# Patient Record
Sex: FEMALE | Race: WHITE | Employment: OTHER | URBAN - METROPOLITAN AREA
[De-identification: names, ages, dates, MRNs, and addresses within clinical notes are randomized per-mention and may not be internally consistent; named-entity substitution may affect disease eponyms.]

---

## 2020-01-24 ENCOUNTER — OP HISTORICAL/CONVERTED ENCOUNTER (OUTPATIENT)
Dept: OTHER | Age: 55
End: 2020-01-24

## 2022-02-13 ENCOUNTER — APPOINTMENT (OUTPATIENT)
Dept: GENERAL RADIOLOGY | Age: 57
DRG: 698 | End: 2022-02-13
Attending: STUDENT IN AN ORGANIZED HEALTH CARE EDUCATION/TRAINING PROGRAM
Payer: MEDICARE

## 2022-02-13 ENCOUNTER — HOSPITAL ENCOUNTER (INPATIENT)
Age: 57
LOS: 3 days | Discharge: LEFT BEFORE DISCHARGE | DRG: 698 | End: 2022-02-16
Attending: STUDENT IN AN ORGANIZED HEALTH CARE EDUCATION/TRAINING PROGRAM | Admitting: INTERNAL MEDICINE
Payer: MEDICARE

## 2022-02-13 ENCOUNTER — APPOINTMENT (OUTPATIENT)
Dept: CT IMAGING | Age: 57
DRG: 698 | End: 2022-02-13
Attending: STUDENT IN AN ORGANIZED HEALTH CARE EDUCATION/TRAINING PROGRAM
Payer: MEDICARE

## 2022-02-13 DIAGNOSIS — N17.9 AKI (ACUTE KIDNEY INJURY) (HCC): ICD-10-CM

## 2022-02-13 DIAGNOSIS — R19.7 DIARRHEA, UNSPECIFIED TYPE: ICD-10-CM

## 2022-02-13 DIAGNOSIS — N39.0 URINARY TRACT INFECTION WITHOUT HEMATURIA, SITE UNSPECIFIED: ICD-10-CM

## 2022-02-13 DIAGNOSIS — A41.9 SEPSIS, DUE TO UNSPECIFIED ORGANISM, UNSPECIFIED WHETHER ACUTE ORGAN DYSFUNCTION PRESENT (HCC): Primary | ICD-10-CM

## 2022-02-13 LAB
ABO + RH BLD: NORMAL
ALBUMIN SERPL-MCNC: 3.6 G/DL (ref 3.5–5)
ALBUMIN/GLOB SERPL: 1.1 {RATIO} (ref 1.1–2.2)
ALP SERPL-CCNC: 53 U/L (ref 45–117)
ALT SERPL-CCNC: 20 U/L (ref 12–78)
ANION GAP SERPL CALC-SCNC: 7 MMOL/L (ref 5–15)
APPEARANCE UR: ABNORMAL
AST SERPL W P-5'-P-CCNC: 17 U/L (ref 15–37)
BACTERIA URNS QL MICRO: NEGATIVE /HPF
BASOPHILS # BLD: 0 K/UL (ref 0–0.1)
BASOPHILS NFR BLD: 0 % (ref 0–1)
BILIRUB SERPL-MCNC: 0.4 MG/DL (ref 0.2–1)
BILIRUB UR QL: NEGATIVE
BLOOD GROUP ANTIBODIES SERPL: NEGATIVE
BUN SERPL-MCNC: 22 MG/DL (ref 6–20)
BUN/CREAT SERPL: 21 (ref 12–20)
CA-I BLD-MCNC: 9.4 MG/DL (ref 8.5–10.1)
CHLORIDE SERPL-SCNC: 105 MMOL/L (ref 97–108)
CO2 SERPL-SCNC: 25 MMOL/L (ref 21–32)
COLOR UR: ABNORMAL
COVID-19 RAPID TEST, COVR: NOT DETECTED
CREAT SERPL-MCNC: 1.07 MG/DL (ref 0.55–1.02)
DIFFERENTIAL METHOD BLD: ABNORMAL
EOSINOPHIL # BLD: 0 K/UL (ref 0–0.4)
EOSINOPHIL NFR BLD: 0 % (ref 0–7)
ERYTHROCYTE [DISTWIDTH] IN BLOOD BY AUTOMATED COUNT: 12.6 % (ref 11.5–14.5)
GLOBULIN SER CALC-MCNC: 3.4 G/DL (ref 2–4)
GLUCOSE SERPL-MCNC: 193 MG/DL (ref 65–100)
GLUCOSE UR STRIP.AUTO-MCNC: NEGATIVE MG/DL
HCT VFR BLD AUTO: 43.3 % (ref 35–47)
HGB BLD-MCNC: 14.1 G/DL (ref 11.5–16)
HGB UR QL STRIP: NEGATIVE
IMM GRANULOCYTES # BLD AUTO: 0.1 K/UL (ref 0–0.04)
IMM GRANULOCYTES NFR BLD AUTO: 0 % (ref 0–0.5)
KETONES UR QL STRIP.AUTO: NEGATIVE MG/DL
LACTATE SERPL-SCNC: 2.4 MMOL/L (ref 0.4–2)
LACTATE SERPL-SCNC: 3.6 MMOL/L (ref 0.4–2)
LEUKOCYTE ESTERASE UR QL STRIP.AUTO: ABNORMAL
LYMPHOCYTES # BLD: 0.2 K/UL (ref 0.8–3.5)
LYMPHOCYTES NFR BLD: 1 % (ref 12–49)
MCH RBC QN AUTO: 29 PG (ref 26–34)
MCHC RBC AUTO-ENTMCNC: 32.6 G/DL (ref 30–36.5)
MCV RBC AUTO: 89.1 FL (ref 80–99)
MONOCYTES # BLD: 0.9 K/UL (ref 0–1)
MONOCYTES NFR BLD: 7 % (ref 5–13)
MUCOUS THREADS URNS QL MICRO: ABNORMAL /LPF
NEUTS SEG # BLD: 12.3 K/UL (ref 1.8–8)
NEUTS SEG NFR BLD: 92 % (ref 32–75)
NITRITE UR QL STRIP.AUTO: POSITIVE
NRBC # BLD: 0 K/UL (ref 0–0.01)
NRBC BLD-RTO: 0 PER 100 WBC
PH UR STRIP: 8 [PH] (ref 5–8)
PLATELET # BLD AUTO: 347 K/UL (ref 150–400)
PMV BLD AUTO: 9.7 FL (ref 8.9–12.9)
POTASSIUM SERPL-SCNC: 4.3 MMOL/L (ref 3.5–5.1)
PROT SERPL-MCNC: 7 G/DL (ref 6.4–8.2)
PROT UR STRIP-MCNC: NEGATIVE MG/DL
RBC # BLD AUTO: 4.86 M/UL (ref 3.8–5.2)
RBC #/AREA URNS HPF: ABNORMAL /HPF (ref 0–5)
SODIUM SERPL-SCNC: 137 MMOL/L (ref 136–145)
SP GR UR REFRACTOMETRY: 1.01 (ref 1–1.03)
SPECIMEN EXP DATE BLD: NORMAL
SPECIMEN SOURCE: NORMAL
UROBILINOGEN UR QL STRIP.AUTO: 0.1 EU/DL (ref 0.1–1)
WBC # BLD AUTO: 13.5 K/UL (ref 3.6–11)
WBC URNS QL MICRO: ABNORMAL /HPF (ref 0–4)

## 2022-02-13 PROCEDURE — 80053 COMPREHEN METABOLIC PANEL: CPT

## 2022-02-13 PROCEDURE — 65270000029 HC RM PRIVATE

## 2022-02-13 PROCEDURE — 96375 TX/PRO/DX INJ NEW DRUG ADDON: CPT

## 2022-02-13 PROCEDURE — 71045 X-RAY EXAM CHEST 1 VIEW: CPT

## 2022-02-13 PROCEDURE — 99285 EMERGENCY DEPT VISIT HI MDM: CPT

## 2022-02-13 PROCEDURE — 74178 CT ABD&PLV WO CNTR FLWD CNTR: CPT

## 2022-02-13 PROCEDURE — 87635 SARS-COV-2 COVID-19 AMP PRB: CPT

## 2022-02-13 PROCEDURE — 74011000258 HC RX REV CODE- 258: Performed by: INTERNAL MEDICINE

## 2022-02-13 PROCEDURE — 96361 HYDRATE IV INFUSION ADD-ON: CPT

## 2022-02-13 PROCEDURE — 74011000636 HC RX REV CODE- 636: Performed by: STUDENT IN AN ORGANIZED HEALTH CARE EDUCATION/TRAINING PROGRAM

## 2022-02-13 PROCEDURE — 96365 THER/PROPH/DIAG IV INF INIT: CPT

## 2022-02-13 PROCEDURE — 87040 BLOOD CULTURE FOR BACTERIA: CPT

## 2022-02-13 PROCEDURE — 87077 CULTURE AEROBIC IDENTIFY: CPT

## 2022-02-13 PROCEDURE — 74011250637 HC RX REV CODE- 250/637: Performed by: STUDENT IN AN ORGANIZED HEALTH CARE EDUCATION/TRAINING PROGRAM

## 2022-02-13 PROCEDURE — 36415 COLL VENOUS BLD VENIPUNCTURE: CPT

## 2022-02-13 PROCEDURE — 96367 TX/PROPH/DG ADDL SEQ IV INF: CPT

## 2022-02-13 PROCEDURE — 81001 URINALYSIS AUTO W/SCOPE: CPT

## 2022-02-13 PROCEDURE — 74011000258 HC RX REV CODE- 258: Performed by: STUDENT IN AN ORGANIZED HEALTH CARE EDUCATION/TRAINING PROGRAM

## 2022-02-13 PROCEDURE — 87186 SC STD MICRODIL/AGAR DIL: CPT

## 2022-02-13 PROCEDURE — 85025 COMPLETE CBC W/AUTO DIFF WBC: CPT

## 2022-02-13 PROCEDURE — 96366 THER/PROPH/DIAG IV INF ADDON: CPT

## 2022-02-13 PROCEDURE — 74011250636 HC RX REV CODE- 250/636: Performed by: STUDENT IN AN ORGANIZED HEALTH CARE EDUCATION/TRAINING PROGRAM

## 2022-02-13 PROCEDURE — 86900 BLOOD TYPING SEROLOGIC ABO: CPT

## 2022-02-13 PROCEDURE — 74011250636 HC RX REV CODE- 250/636: Performed by: INTERNAL MEDICINE

## 2022-02-13 PROCEDURE — 83605 ASSAY OF LACTIC ACID: CPT

## 2022-02-13 PROCEDURE — 87086 URINE CULTURE/COLONY COUNT: CPT

## 2022-02-13 RX ORDER — ONDANSETRON 2 MG/ML
4 INJECTION INTRAMUSCULAR; INTRAVENOUS
Status: COMPLETED | OUTPATIENT
Start: 2022-02-13 | End: 2022-02-13

## 2022-02-13 RX ORDER — METOPROLOL SUCCINATE 25 MG/1
25 TABLET, EXTENDED RELEASE ORAL DAILY
Status: DISCONTINUED | OUTPATIENT
Start: 2022-02-14 | End: 2022-02-16 | Stop reason: HOSPADM

## 2022-02-13 RX ORDER — ACETAMINOPHEN 650 MG/1
650 SUPPOSITORY RECTAL
Status: DISCONTINUED | OUTPATIENT
Start: 2022-02-13 | End: 2022-02-16 | Stop reason: HOSPADM

## 2022-02-13 RX ORDER — ENALAPRIL MALEATE 2.5 MG/1
2.5 TABLET ORAL DAILY
Status: DISCONTINUED | OUTPATIENT
Start: 2022-02-14 | End: 2022-02-16 | Stop reason: HOSPADM

## 2022-02-13 RX ORDER — ONDANSETRON 2 MG/ML
4 INJECTION INTRAMUSCULAR; INTRAVENOUS
Status: DISCONTINUED | OUTPATIENT
Start: 2022-02-13 | End: 2022-02-16 | Stop reason: HOSPADM

## 2022-02-13 RX ORDER — SODIUM CHLORIDE 0.9 % (FLUSH) 0.9 %
5-40 SYRINGE (ML) INJECTION EVERY 8 HOURS
Status: DISCONTINUED | OUTPATIENT
Start: 2022-02-13 | End: 2022-02-14

## 2022-02-13 RX ORDER — ENOXAPARIN SODIUM 100 MG/ML
40 INJECTION SUBCUTANEOUS DAILY
Status: DISCONTINUED | OUTPATIENT
Start: 2022-02-14 | End: 2022-02-13

## 2022-02-13 RX ORDER — CLOPIDOGREL BISULFATE 75 MG/1
75 TABLET ORAL DAILY
COMMUNITY

## 2022-02-13 RX ORDER — ONDANSETRON 4 MG/1
4 TABLET, ORALLY DISINTEGRATING ORAL
Status: DISCONTINUED | OUTPATIENT
Start: 2022-02-13 | End: 2022-02-16 | Stop reason: HOSPADM

## 2022-02-13 RX ORDER — SODIUM CHLORIDE 0.9 % (FLUSH) 0.9 %
5-40 SYRINGE (ML) INJECTION AS NEEDED
Status: DISCONTINUED | OUTPATIENT
Start: 2022-02-13 | End: 2022-02-16 | Stop reason: HOSPADM

## 2022-02-13 RX ORDER — METFORMIN HYDROCHLORIDE 1000 MG/1
1000 TABLET ORAL 2 TIMES DAILY
COMMUNITY

## 2022-02-13 RX ORDER — ENALAPRIL MALEATE 2.5 MG/1
2.5 TABLET ORAL DAILY
COMMUNITY

## 2022-02-13 RX ORDER — LOVASTATIN 40 MG/1
40 TABLET ORAL DAILY
COMMUNITY

## 2022-02-13 RX ORDER — POLYETHYLENE GLYCOL 3350 17 G/17G
17 POWDER, FOR SOLUTION ORAL DAILY PRN
Status: DISCONTINUED | OUTPATIENT
Start: 2022-02-13 | End: 2022-02-16 | Stop reason: HOSPADM

## 2022-02-13 RX ORDER — METFORMIN HYDROCHLORIDE 500 MG/1
1000 TABLET ORAL 2 TIMES DAILY WITH MEALS
Status: DISCONTINUED | OUTPATIENT
Start: 2022-02-14 | End: 2022-02-14

## 2022-02-13 RX ORDER — ACETAMINOPHEN 500 MG
1000 TABLET ORAL
Status: COMPLETED | OUTPATIENT
Start: 2022-02-13 | End: 2022-02-13

## 2022-02-13 RX ORDER — ACETAMINOPHEN 325 MG/1
650 TABLET ORAL
Status: DISCONTINUED | OUTPATIENT
Start: 2022-02-13 | End: 2022-02-16 | Stop reason: HOSPADM

## 2022-02-13 RX ORDER — CLOPIDOGREL BISULFATE 75 MG/1
75 TABLET ORAL EVERY 24 HOURS
Status: DISCONTINUED | OUTPATIENT
Start: 2022-02-13 | End: 2022-02-13

## 2022-02-13 RX ORDER — METOPROLOL SUCCINATE 25 MG/1
25 TABLET, EXTENDED RELEASE ORAL DAILY
COMMUNITY

## 2022-02-13 RX ORDER — ATORVASTATIN CALCIUM 10 MG/1
10 TABLET, FILM COATED ORAL
Status: DISCONTINUED | OUTPATIENT
Start: 2022-02-14 | End: 2022-02-16 | Stop reason: HOSPADM

## 2022-02-13 RX ADMIN — SODIUM CHLORIDE 1000 ML: 9 INJECTION, SOLUTION INTRAVENOUS at 19:49

## 2022-02-13 RX ADMIN — SODIUM CHLORIDE 1000 ML: 9 INJECTION, SOLUTION INTRAVENOUS at 15:28

## 2022-02-13 RX ADMIN — ONDANSETRON 4 MG: 2 INJECTION INTRAMUSCULAR; INTRAVENOUS at 16:09

## 2022-02-13 RX ADMIN — VANCOMYCIN HYDROCHLORIDE 500 MG: 500 INJECTION, POWDER, LYOPHILIZED, FOR SOLUTION INTRAVENOUS at 20:34

## 2022-02-13 RX ADMIN — IOPAMIDOL 100 ML: 755 INJECTION, SOLUTION INTRAVENOUS at 17:34

## 2022-02-13 RX ADMIN — PIPERACILLIN AND TAZOBACTAM 3.38 G: 3; .375 INJECTION, POWDER, LYOPHILIZED, FOR SOLUTION INTRAVENOUS at 16:10

## 2022-02-13 RX ADMIN — VANCOMYCIN HYDROCHLORIDE 1000 MG: 1 INJECTION, POWDER, LYOPHILIZED, FOR SOLUTION INTRAVENOUS at 18:24

## 2022-02-13 RX ADMIN — ACETAMINOPHEN 1000 MG: 500 TABLET ORAL at 18:29

## 2022-02-13 NOTE — ED PROVIDER NOTES
[facility]  EMERGENCY DEPARTMENT ENCOUNTER NOTE    Date: 2/13/2022  Patient Name: Fadi Banks    History of Presenting Illness     Chief Complaint   Patient presents with    Vomiting    Blood in Urine     HPI: Fadi Banks, 61 y.o. female with A past medical history of bladder cancer status post cystectomy currently with urostomy, diabetes, hypertension, CAD status post stents, and CVA x2, currently on Metformin, metoprolol, Plavix, Januvia, and Metformin presents for sepsis. Patient reports that she woke up today with nausea and vomiting. Denies abdominal pain, headache, cough, shortness of breath, rhinorrhea, sore throat. New hematuria and tissue were noted in her urostomy bag but she has adequate output. Medical History   I reviewed the medical, surgical, family, and social history, as well as allergies:    PCP: Corwin Diaz MD    Past Medical History:  History reviewed. No pertinent past medical history. Past Surgical History:  No past surgical history on file. Current Outpatient Medications:  No current outpatient medications   Family History:  History reviewed. No pertinent family history. Social History:  Social History     Tobacco Use    Smoking status: Not on file    Smokeless tobacco: Not on file   Substance Use Topics    Alcohol use: Not on file    Drug use: Not on file     Allergies:  No Known Allergies    Review of Systems     Review of Systems  Negative: All other systems negative. Physical Exam and Vital Signs   Vital Signs - Reviewed the patient's vital signs.     Patient Vitals for the past 12 hrs:   Temp Pulse Resp BP SpO2   02/13/22 1745 (!) 101.1 °F (38.4 °C) -- -- -- --   02/13/22 1529 -- 88 17 109/60 98 %   02/13/22 1448 (!) 100.6 °F (38.1 °C) 91 18 (!) 84/60 99 %     Physical Exam:    GENERAL: awake, alert, cooperative, not in distress  HEENT:  * Pupils equal, EOMI  * Head atraumatic  CV:  * regular rhythm  * warm and perfused extremities bilaterally  PULMONARY: Good air movement, no wheezes or crackles  ABDOMEN: soft, not distended, no guarding, not tenderness to palpation  : No suprapubic tenderness. Gross hematuria, pinkish color, of small amount of urine in the urostomy bag with tissue collections. EXTREMITIES/BACK: warm and perfused, no tenderness, no edema  SKIN: no rashes or signs of trauma  NEURO:  * Speech clear  * Moves U&LE to command    Medical Decision Making   - I am the first and primary provider for this patient and am the primary provider of record. - I reviewed the vital signs, available nursing notes, past medical history, past surgical history, family history and social history. - Initial assessment performed. The patients presenting problems have been discussed, and the staff are in agreement with the care plan formulated and outlined with them. I have encouraged them to ask questions as they arise throughout their visit. - Available medical records, nursing notes, old EKGs, and EMS run sheets (if patient was EMS transported) were reviewed    MDM:   Patient is a 61 y.o. female presenting for fever and vomiting. Vitals reveal fever, HR 90 and low BP and physical exam reveals urostomy hematuria. Based on the history, physical exam, risk factors, and vitals signs, differential includes: Hematuria, UTI, COVID-19, pneumonia, colitis, enteritis, hepatitis. Will start antibiotics. Will give fluids. Will swab for Covid. Sepsis workup initiated. Will initiate workup and symptomatic treatment. See ED Course and Reassessment for results and interpretations.     Results     Labs:  Recent Results (from the past 12 hour(s))   CBC WITH AUTOMATED DIFF    Collection Time: 02/13/22  3:19 PM   Result Value Ref Range    WBC 13.5 (H) 3.6 - 11.0 K/uL    RBC 4.86 3.80 - 5.20 M/uL    HGB 14.1 11.5 - 16.0 g/dL    HCT 43.3 35.0 - 47.0 %    MCV 89.1 80.0 - 99.0 FL    MCH 29.0 26.0 - 34.0 PG    MCHC 32.6 30.0 - 36.5 g/dL RDW 12.6 11.5 - 14.5 %    PLATELET 336 647 - 201 K/uL    MPV 9.7 8.9 - 12.9 FL    NRBC 0.0 0.0  WBC    ABSOLUTE NRBC 0.00 0.00 - 0.01 K/uL    NEUTROPHILS 92 (H) 32 - 75 %    LYMPHOCYTES 1 (L) 12 - 49 %    MONOCYTES 7 5 - 13 %    EOSINOPHILS 0 0 - 7 %    BASOPHILS 0 0 - 1 %    IMMATURE GRANULOCYTES 0 0 - 0.5 %    ABS. NEUTROPHILS 12.3 (H) 1.8 - 8.0 K/UL    ABS. LYMPHOCYTES 0.2 (L) 0.8 - 3.5 K/UL    ABS. MONOCYTES 0.9 0.0 - 1.0 K/UL    ABS. EOSINOPHILS 0.0 0.0 - 0.4 K/UL    ABS. BASOPHILS 0.0 0.0 - 0.1 K/UL    ABS. IMM. GRANS. 0.1 (H) 0.00 - 0.04 K/UL    DF AUTOMATED     METABOLIC PANEL, COMPREHENSIVE    Collection Time: 02/13/22  3:19 PM   Result Value Ref Range    Sodium 137 136 - 145 mmol/L    Potassium 4.3 3.5 - 5.1 mmol/L    Chloride 105 97 - 108 mmol/L    CO2 25 21 - 32 mmol/L    Anion gap 7 5 - 15 mmol/L    Glucose 193 (H) 65 - 100 mg/dL    BUN 22 (H) 6 - 20 mg/dL    Creatinine 1.07 (H) 0.55 - 1.02 mg/dL    BUN/Creatinine ratio 21 (H) 12 - 20      GFR est AA >60 >60 ml/min/1.73m2    GFR est non-AA 53 (L) >60 ml/min/1.73m2    Calcium 9.4 8.5 - 10.1 mg/dL    Bilirubin, total 0.4 0.2 - 1.0 mg/dL    AST (SGOT) 17 15 - 37 U/L    ALT (SGPT) 20 12 - 78 U/L    Alk.  phosphatase 53 45 - 117 U/L    Protein, total 7.0 6.4 - 8.2 g/dL    Albumin 3.6 3.5 - 5.0 g/dL    Globulin 3.4 2.0 - 4.0 g/dL    A-G Ratio 1.1 1.1 - 2.2     LACTIC ACID    Collection Time: 02/13/22  3:19 PM   Result Value Ref Range    Lactic acid 2.4 (HH) 0.4 - 2.0 mmol/L   TYPE & SCREEN    Collection Time: 02/13/22  3:19 PM   Result Value Ref Range    Crossmatch Expiration 02/16/2022,2359     ABO/Rh(D) [place] Positive     Antibody screen Negative    URINALYSIS W/MICROSCOPIC    Collection Time: 02/13/22  3:33 PM   Result Value Ref Range    Color Pink      Appearance Turbid (A) Clear      Specific gravity 1.011 1.003 - 1.030      pH (UA) 8.0 5.0 - 8.0      Protein Negative Negative mg/dL    Glucose Negative Negative mg/dL    Ketone Negative Negative mg/dL Bilirubin Negative Negative      Blood Negative Negative      Urobilinogen 0.1 0.1 - 1.0 EU/dL    Nitrites Positive (A) Negative      Leukocyte Esterase Small (A) Negative      WBC 5-10 0 - 4 /hpf    RBC 0-5 0 - 5 /hpf    Bacteria Negative Negative /hpf    Mucus Trace /lpf   COVID-19 RAPID TEST    Collection Time: 02/13/22  4:14 PM   Result Value Ref Range    Specimen source Nasopharyngeal      COVID-19 rapid test Not Detected Not Detected     LACTIC ACID    Collection Time: 02/13/22  6:25 PM   Result Value Ref Range    Lactic acid 3.6 (HH) 0.4 - 2.0 mmol/L     Radiologic Studies:  CT Results  (Last 48 hours)               02/13/22 1736  CT ABD PELV W WO CONT Final result    Impression:  Similar prominence left renal collecting system. Lower moiety hydronephrosis right kidney advanced compared to prior imaging. No definite calcified stone. Ileal pouch with small quantity nondependent air. Consider collecting system infectious/inflammatory process. Exact cause for hematuria undetermined. Narrative:  CT abdomen and pelvis without and with IV contrast.       Comparison CT abdomen and pelvis November 19, 2014. Axial images are reviewed along with reformatted sagittal/coronal images. 100 mL   Isovue 370 administered. Dose reduction: All CT scans at this facility are performed using dose reduction   optimization techniques as appropriate to a performed exam including the   following-   automated exposure control, adjustments of mA and/or Kv according to patient   size, or use of iterative reconstructive technique. Lung bases are clear. Nonenhanced and enhanced images demonstrate similar prominence left upper renal   collecting system. Duplex right kidney with lower moiety hydronephrosis advanced compared to prior   imaging. No definite calcified stone. Venous phase images demonstrate normal enhancement for the liver. Mild   distention of gallbladder. Pancreas, spleen, and bilateral adrenal glands appear   unremarkable. Stomach and small bowel loops are fluid-filled but not appreciably distended. Right lower quadrant ileal pouch noted with small quantity nondependent air. Appendix unremarkable. Stool and air through colon. No ascites. Prior hysterectomy. Atherosclerotic change normal caliber abdominal aorta. CXR Results  (Last 48 hours)               02/13/22 1545  XR CHEST PORT Final result    Impression:  No acute process, given limitations of portable technique. Narrative:  Chest, AP portable       COMPARISON: None recent. The heart, mediastinum and pulmonary vasculature appear normal.  The lungs are   clear. Medications ordered:  Medications   sodium chloride 0.9 % bolus infusion 1,000 mL (has no administration in time range)   sodium chloride 0.9 % bolus infusion 1,000 mL (0 mL IntraVENous IV Completed 2/13/22 1902)   ondansetron (ZOFRAN) injection 4 mg (4 mg IntraVENous Given 2/13/22 1609)   vancomycin (VANCOCIN) 1,000 mg in 0.9% sodium chloride 250 mL (VIAL-MATE) (1,000 mg IntraVENous New Bag 2/13/22 1824)   piperacillin-tazobactam (ZOSYN) 3.375 g in 0.9% sodium chloride (MBP/ADV) 100 mL MBP (0 g IntraVENous IV Completed 2/13/22 1902)   iopamidoL (ISOVUE-370) 76 % injection 100 mL (100 mL IntraVENous Given 2/13/22 1734)   acetaminophen (TYLENOL) tablet 1,000 mg (1,000 mg Oral Given 2/13/22 1829)     ED Course and Reassessment     ED Course:     ED Course as of 02/13/22 1932   Sun Feb 13, 2022   1556 Leukocytosis noted. Hemoglobin not suggestive of acute anemia. Platelet count is normal.   [SS]   1601 Urinalysis is positive for nitrites and small leukocyte esterase and with small leukocytosis. No bacteria present. [SS]   1624 Chest x-ray negative for acute pathology: no concern for pulmonary edema, pleural effusion, pneumothorax, or pneumonia.    [SS]   1624 No significant electrolyte derangements. Creatinine is not elevated more than baseline range making NAPOLEON unlikely. No significant transaminitis noted. Normal bilirubin. [SS]   1624 Lactate mildly elevated, will give fluids. [SS]   2375 [id number] testing is negative.   [SS]      ED Course User Index  [SS] Donita Francis MD       Reassessment:    Stable. Lactate worse, blood pressure stable. To give more fluids. Will admit for collecting system infection as the patient has a CT that suggests the collecting system infection. Final Disposition     Admission: [place] [id number]    After completion of ED workup and discussion of results and diagnoses, patient was admitted to the hospital. Case was discussed with admitting provider. Diagnosis     Clinical Impression:   1. Sepsis, due to unspecified organism, unspecified whether acute organ dysfunction present ([facility] [address]    2. Urinary tract infection without hematuria, site unspecified      Attestations:    Je Scott MD    Please note that this dictation was completed with [employer], the computer voice recognition software. Quite often unanticipated grammatical, syntax, homophones, and other interpretive errors are inadvertently transcribed by the computer software. Please disregard these errors. Please excuse any errors that have escaped final proofreading. Thank you.

## 2022-02-13 NOTE — ED TRIAGE NOTES
Pt arrives with c/o vomiting x2 days and also blood in urostomy bag. Pt is cognitively impaired, sister is providing information for triage. Pt is febrile at 100.6.  Otherwise, VSS

## 2022-02-13 NOTE — ED NOTES
Bedside shift change report given to Vincent Ferris (oncoming nurse) by Farzaneh Muro  (offgoing nurse). Report included the following information SBAR and ED Summary.

## 2022-02-14 LAB
ANION GAP SERPL CALC-SCNC: 6 MMOL/L (ref 5–15)
BUN SERPL-MCNC: 14 MG/DL (ref 6–20)
BUN/CREAT SERPL: 17 (ref 12–20)
CA-I BLD-MCNC: 7.3 MG/DL (ref 8.5–10.1)
CHLORIDE SERPL-SCNC: 116 MMOL/L (ref 97–108)
CO2 SERPL-SCNC: 20 MMOL/L (ref 21–32)
CREAT SERPL-MCNC: 0.83 MG/DL (ref 0.55–1.02)
ERYTHROCYTE [DISTWIDTH] IN BLOOD BY AUTOMATED COUNT: 12.6 % (ref 11.5–14.5)
GLUCOSE BLD STRIP.AUTO-MCNC: 123 MG/DL (ref 65–117)
GLUCOSE SERPL-MCNC: 181 MG/DL (ref 65–100)
HCT VFR BLD AUTO: 34.2 % (ref 35–47)
HGB BLD-MCNC: 10.9 G/DL (ref 11.5–16)
LACTATE SERPL-SCNC: 3 MMOL/L (ref 0.4–2)
LACTATE SERPL-SCNC: 3.1 MMOL/L (ref 0.4–2)
MCH RBC QN AUTO: 28.8 PG (ref 26–34)
MCHC RBC AUTO-ENTMCNC: 31.9 G/DL (ref 30–36.5)
MCV RBC AUTO: 90.2 FL (ref 80–99)
NRBC # BLD: 0 K/UL (ref 0–0.01)
NRBC BLD-RTO: 0 PER 100 WBC
PERFORMED BY, TECHID: ABNORMAL
PLATELET # BLD AUTO: 231 K/UL (ref 150–400)
PMV BLD AUTO: 9.5 FL (ref 8.9–12.9)
POTASSIUM SERPL-SCNC: 3.6 MMOL/L (ref 3.5–5.1)
RBC # BLD AUTO: 3.79 M/UL (ref 3.8–5.2)
SODIUM SERPL-SCNC: 142 MMOL/L (ref 136–145)
VANCOMYCIN SERPL-MCNC: 12.4 UG/ML
WBC # BLD AUTO: 7.9 K/UL (ref 3.6–11)

## 2022-02-14 PROCEDURE — 83036 HEMOGLOBIN GLYCOSYLATED A1C: CPT

## 2022-02-14 PROCEDURE — 80048 BASIC METABOLIC PNL TOTAL CA: CPT

## 2022-02-14 PROCEDURE — 65270000029 HC RM PRIVATE

## 2022-02-14 PROCEDURE — 80202 ASSAY OF VANCOMYCIN: CPT

## 2022-02-14 PROCEDURE — 74011000250 HC RX REV CODE- 250: Performed by: INTERNAL MEDICINE

## 2022-02-14 PROCEDURE — 99222 1ST HOSP IP/OBS MODERATE 55: CPT | Performed by: INTERNAL MEDICINE

## 2022-02-14 PROCEDURE — 85027 COMPLETE CBC AUTOMATED: CPT

## 2022-02-14 PROCEDURE — 74011000258 HC RX REV CODE- 258: Performed by: INTERNAL MEDICINE

## 2022-02-14 PROCEDURE — 74011250636 HC RX REV CODE- 250/636: Performed by: INTERNAL MEDICINE

## 2022-02-14 PROCEDURE — 83605 ASSAY OF LACTIC ACID: CPT

## 2022-02-14 PROCEDURE — 74011250637 HC RX REV CODE- 250/637: Performed by: INTERNAL MEDICINE

## 2022-02-14 PROCEDURE — 36415 COLL VENOUS BLD VENIPUNCTURE: CPT

## 2022-02-14 PROCEDURE — 74011636637 HC RX REV CODE- 636/637: Performed by: INTERNAL MEDICINE

## 2022-02-14 PROCEDURE — 82962 GLUCOSE BLOOD TEST: CPT

## 2022-02-14 RX ORDER — CLOPIDOGREL BISULFATE 75 MG/1
75 TABLET ORAL DAILY
Status: DISCONTINUED | OUTPATIENT
Start: 2022-02-15 | End: 2022-02-16 | Stop reason: HOSPADM

## 2022-02-14 RX ORDER — MAGNESIUM SULFATE 100 %
4 CRYSTALS MISCELLANEOUS AS NEEDED
Status: DISCONTINUED | OUTPATIENT
Start: 2022-02-14 | End: 2022-02-16 | Stop reason: HOSPADM

## 2022-02-14 RX ORDER — DEXTROSE 50 % IN WATER (D50W) INTRAVENOUS SYRINGE
25-50 AS NEEDED
Status: DISCONTINUED | OUTPATIENT
Start: 2022-02-14 | End: 2022-02-14

## 2022-02-14 RX ORDER — DEXTROSE MONOHYDRATE 100 MG/ML
125-250 INJECTION, SOLUTION INTRAVENOUS AS NEEDED
Status: DISCONTINUED | OUTPATIENT
Start: 2022-02-14 | End: 2022-02-16 | Stop reason: HOSPADM

## 2022-02-14 RX ORDER — INSULIN GLARGINE 100 [IU]/ML
7 INJECTION, SOLUTION SUBCUTANEOUS
Status: DISCONTINUED | OUTPATIENT
Start: 2022-02-14 | End: 2022-02-16 | Stop reason: HOSPADM

## 2022-02-14 RX ORDER — INSULIN LISPRO 100 [IU]/ML
INJECTION, SOLUTION INTRAVENOUS; SUBCUTANEOUS
Status: DISCONTINUED | OUTPATIENT
Start: 2022-02-14 | End: 2022-02-16 | Stop reason: HOSPADM

## 2022-02-14 RX ADMIN — ATORVASTATIN CALCIUM 10 MG: 10 TABLET, FILM COATED ORAL at 22:04

## 2022-02-14 RX ADMIN — PIPERACILLIN AND TAZOBACTAM 3.38 G: 3; .375 INJECTION, POWDER, LYOPHILIZED, FOR SOLUTION INTRAVENOUS at 23:59

## 2022-02-14 RX ADMIN — METFORMIN HYDROCHLORIDE 1000 MG: 500 TABLET ORAL at 08:58

## 2022-02-14 RX ADMIN — SODIUM CHLORIDE, PRESERVATIVE FREE 10 ML: 5 INJECTION INTRAVENOUS at 18:21

## 2022-02-14 RX ADMIN — METOPROLOL SUCCINATE 25 MG: 25 TABLET, EXTENDED RELEASE ORAL at 08:58

## 2022-02-14 RX ADMIN — PIPERACILLIN AND TAZOBACTAM 3.38 G: 3; .375 INJECTION, POWDER, LYOPHILIZED, FOR SOLUTION INTRAVENOUS at 08:59

## 2022-02-14 RX ADMIN — SODIUM CHLORIDE, PRESERVATIVE FREE 10 ML: 5 INJECTION INTRAVENOUS at 05:17

## 2022-02-14 RX ADMIN — PIPERACILLIN AND TAZOBACTAM 3.38 G: 3; .375 INJECTION, POWDER, LYOPHILIZED, FOR SOLUTION INTRAVENOUS at 18:21

## 2022-02-14 RX ADMIN — SODIUM CHLORIDE, PRESERVATIVE FREE 10 ML: 5 INJECTION INTRAVENOUS at 13:28

## 2022-02-14 RX ADMIN — INSULIN GLARGINE 7 UNITS: 100 INJECTION, SOLUTION SUBCUTANEOUS at 22:00

## 2022-02-14 RX ADMIN — SODIUM CHLORIDE, PRESERVATIVE FREE 10 ML: 5 INJECTION INTRAVENOUS at 00:30

## 2022-02-14 RX ADMIN — ENALAPRIL MALEATE 2.5 MG: 2.5 TABLET ORAL at 08:59

## 2022-02-14 RX ADMIN — PIPERACILLIN AND TAZOBACTAM 3.38 G: 3; .375 INJECTION, POWDER, LYOPHILIZED, FOR SOLUTION INTRAVENOUS at 00:28

## 2022-02-14 NOTE — PROGRESS NOTES
Hospitalist Progress Note    Subjective:   Daily Progress Note: 2/14/2022 1:35 PM    Hospital Course:  Marely Rizo is a 61 y.o. female CAD s/p stent, moyamoya disease, bladder cancer s/p cystectomy with urostomy, diabetes, hypertension, and CVA. She presented to the ED today for 1 day history of diarrhea, and vomiting. She also reports associated fevers and noticing bloody urine in her urostomy bag. Denies abdominal pain, or pain around the ostomy site. She otherwise denies cough, shortness of breath, or sore throat.      In the ED, she was febrile at 100.5 °F.,  Hypotensive at 84/60. Also has leukocytosis, WBC 13.5. Initial lactic acid of 2.4. Urinalysis not indicated of UTI, however, CT abdomen showed hydronephrosis of right kidney and findings suggestive of collecting system infectious/inflammatory process. Sepsis protocol was initiated. Subjective:  Patient denies any new complaints.     Current Facility-Administered Medications   Medication Dose Route Frequency    piperacillin-tazobactam (ZOSYN) 3.375 g in 0.9% sodium chloride (MBP/ADV) 100 mL MBP  3.375 g IntraVENous Q8H    sodium chloride (NS) flush 5-40 mL  5-40 mL IntraVENous Q8H    sodium chloride (NS) flush 5-40 mL  5-40 mL IntraVENous PRN    acetaminophen (TYLENOL) tablet 650 mg  650 mg Oral Q6H PRN    Or    acetaminophen (TYLENOL) suppository 650 mg  650 mg Rectal Q6H PRN    polyethylene glycol (MIRALAX) packet 17 g  17 g Oral DAILY PRN    ondansetron (ZOFRAN ODT) tablet 4 mg  4 mg Oral Q8H PRN    Or    ondansetron (ZOFRAN) injection 4 mg  4 mg IntraVENous Q6H PRN    enalapril (VASOTEC) tablet 2.5 mg  2.5 mg Oral DAILY    atorvastatin (LIPITOR) tablet 10 mg  10 mg Oral QHS    metFORMIN (GLUCOPHAGE) tablet 1,000 mg  1,000 mg Oral BID WITH MEALS    metoprolol succinate (TOPROL-XL) XL tablet 25 mg  25 mg Oral DAILY     Current Outpatient Medications   Medication Sig    SITagliptin (Januvia) 100 mg tablet Take 100 mg by mouth daily.    clopidogreL (PLAVIX) 75 mg tab Take 75 mg by mouth daily.  enalapril (VASOTEC) 2.5 mg tablet Take 2.5 mg by mouth daily.  lovastatin (MEVACOR) 40 mg tablet Take 40 mg by mouth daily.  metFORMIN (GLUCOPHAGE) 1,000 mg tablet Take 1,000 mg by mouth two (2) times a day.  metoprolol succinate (TOPROL-XL) 25 mg XL tablet Take 25 mg by mouth daily. Review of Systems  Constitutional: has fevers, has chills, No sweats, No fatigue, No Weakness  Eyes: No redness  Ears, nose, mouth, throat, and face: No nasal congestion, No sore throat, No voice change  Respiratory: No Shortness of Breath, No cough, No wheezing  Cardiovascular: No chest pain, No palpitations, No extremity edema  Gastrointestinal: No nausea, No vomiting, No diarrhea, No abdominal pain  Genitourinary: No frequency, No dysuria, No hematuria  Integument/breast: No skin lesion(s)   Neurological: No Confusion, No headaches, No dizziness      Objective:     Visit Vitals  BP (!) 121/50   Pulse 88   Temp 98.6 °F (37 °C)   Resp 16   Ht 5' 1\" (1.549 m)   Wt 85.3 kg (188 lb)   SpO2 100%   BMI 35.52 kg/m²      O2 Device: None (Room air)    Temp (24hrs), Av.8 °F (37.7 °C), Min:98.6 °F (37 °C), Max:101.1 °F (38.4 °C)      701 -  190  In: 100 [I.V.:100]  Out: -    190 -  0700  In: 3450 [I.V.:3450]  Out: -     PHYSICAL EXAM:  Constitutional: No acute distress  Skin: Extremities and face reveal no rashes. HEENT: Sclerae anicteric. Extra-occular muscles are intact. No oral ulcers. The neck is supple and no masses. Cardiovascular: Regular rate and rhythm. Respiratory:  Clear breath sounds bilaterally with no wheezes, rales, or rhonchi. GI: Abdomen nondistended, soft, and nontender. Normal active bowel sounds. Urostomy bag draining clear urine. Musculoskeletal: No pitting edema of the lower legs. Able to move all ext  Neurological:  Patient is alert and oriented.  Cranial nerves II-XII grossly intact  Psychiatric: Mood appears appropriate       Data Review    Recent Results (from the past 24 hour(s))   CBC WITH AUTOMATED DIFF    Collection Time: 02/13/22  3:19 PM   Result Value Ref Range    WBC 13.5 (H) 3.6 - 11.0 K/uL    RBC 4.86 3.80 - 5.20 M/uL    HGB 14.1 11.5 - 16.0 g/dL    HCT 43.3 35.0 - 47.0 %    MCV 89.1 80.0 - 99.0 FL    MCH 29.0 26.0 - 34.0 PG    MCHC 32.6 30.0 - 36.5 g/dL    RDW 12.6 11.5 - 14.5 %    PLATELET 611 241 - 125 K/uL    MPV 9.7 8.9 - 12.9 FL    NRBC 0.0 0.0  WBC    ABSOLUTE NRBC 0.00 0.00 - 0.01 K/uL    NEUTROPHILS 92 (H) 32 - 75 %    LYMPHOCYTES 1 (L) 12 - 49 %    MONOCYTES 7 5 - 13 %    EOSINOPHILS 0 0 - 7 %    BASOPHILS 0 0 - 1 %    IMMATURE GRANULOCYTES 0 0 - 0.5 %    ABS. NEUTROPHILS 12.3 (H) 1.8 - 8.0 K/UL    ABS. LYMPHOCYTES 0.2 (L) 0.8 - 3.5 K/UL    ABS. MONOCYTES 0.9 0.0 - 1.0 K/UL    ABS. EOSINOPHILS 0.0 0.0 - 0.4 K/UL    ABS. BASOPHILS 0.0 0.0 - 0.1 K/UL    ABS. IMM. GRANS. 0.1 (H) 0.00 - 0.04 K/UL    DF AUTOMATED     METABOLIC PANEL, COMPREHENSIVE    Collection Time: 02/13/22  3:19 PM   Result Value Ref Range    Sodium 137 136 - 145 mmol/L    Potassium 4.3 3.5 - 5.1 mmol/L    Chloride 105 97 - 108 mmol/L    CO2 25 21 - 32 mmol/L    Anion gap 7 5 - 15 mmol/L    Glucose 193 (H) 65 - 100 mg/dL    BUN 22 (H) 6 - 20 mg/dL    Creatinine 1.07 (H) 0.55 - 1.02 mg/dL    BUN/Creatinine ratio 21 (H) 12 - 20      GFR est AA >60 >60 ml/min/1.73m2    GFR est non-AA 53 (L) >60 ml/min/1.73m2    Calcium 9.4 8.5 - 10.1 mg/dL    Bilirubin, total 0.4 0.2 - 1.0 mg/dL    AST (SGOT) 17 15 - 37 U/L    ALT (SGPT) 20 12 - 78 U/L    Alk.  phosphatase 53 45 - 117 U/L    Protein, total 7.0 6.4 - 8.2 g/dL    Albumin 3.6 3.5 - 5.0 g/dL    Globulin 3.4 2.0 - 4.0 g/dL    A-G Ratio 1.1 1.1 - 2.2     LACTIC ACID    Collection Time: 02/13/22  3:19 PM   Result Value Ref Range    Lactic acid 2.4 (HH) 0.4 - 2.0 mmol/L   CULTURE, BLOOD, PAIRED    Collection Time: 02/13/22  3:19 PM    Specimen: Blood   Result Value Ref Range Special Requests: No Special Requests      Culture result: No growth after 2 hours     TYPE & SCREEN    Collection Time: 02/13/22  3:19 PM   Result Value Ref Range    Crossmatch Expiration 02/16/2022,2359     ABO/Rh(D) [place]al [place] Positive     Antibody screen Negative    URINALYSIS W/MICROSCOPIC    Collection Time: 02/13/22  3:33 PM   Result Value Ref Range    Color Pink      Appearance Turbid (A) Clear      Specific gravity 1.011 1.003 - 1.030      pH (UA) 8.0 5.0 - 8.0      Protein Negative Negative mg/dL    Glucose Negative Negative mg/dL    Ketone Negative Negative mg/dL    Bilirubin Negative Negative      Blood Negative Negative      Urobilinogen 0.1 0.1 - 1.0 EU/dL    Nitrites Positive (A) Negative      Leukocyte Esterase Small (A) Negative      WBC 5-10 0 - 4 /hpf    RBC 0-5 0 - 5 /hpf    Bacteria Negative Negative /hpf    Mucus Trace /lpf   COVID-19 RAPID TEST    Collection Time: 02/13/22  4:14 PM   Result Value Ref Range    Specimen source Nasopharyngeal      COVID-19 rapid test Not Detected Not Detected     LACTIC ACID    Collection Time: 02/13/22  6:25 PM   Result Value Ref Range    Lactic acid 3.6 (HH) 0.4 - 2.0 mmol/L   LACTIC ACID    Collection Time: 02/14/22 12:29 AM   Result Value Ref Range    Lactic acid 3.0 (HH) 0.4 - 2.0 mmol/L   METABOLIC PANEL, BASIC    Collection Time: 02/14/22  4:43 AM   Result Value Ref Range    Sodium 142 136 - 145 mmol/L    Potassium 3.6 3.5 - 5.1 mmol/L    Chloride 116 (H) 97 - 108 mmol/L    CO2 20 (L) 21 - 32 mmol/L    Anion gap 6 5 - 15 mmol/L    Glucose 181 (H) 65 - 100 mg/dL    BUN 14 6 - 20 mg/dL    Creatinine 0.83 0.55 - 1.02 mg/dL    BUN/Creatinine ratio 17 12 - 20      GFR est AA >60 >60 ml/min/1.73m2    GFR est non-AA >60 >60 ml/min/1.73m2    Calcium 7.3 (L) 8.5 - 10.1 mg/dL   CBC W/O DIFF    Collection Time: 02/14/22  4:43 AM   Result Value Ref Range    WBC 7.9 3.6 - 11.0 K/uL    RBC 3.79 (L) 3.80 - 5.20 M/uL    HGB 10.9 (L) 11.5 - 16.0 g/dL    HCT 34.2 (L) 35.0 - 47.0 %    MCV 90.2 80.0 - 99.0 FL    MCH 28.8 26.0 - 34.0 PG    MCHC 31.9 30.0 - 36.5 g/dL    RDW 12.6 11.5 - 14.5 %    PLATELET 879 327 - 572 K/uL    MPV 9.5 8.9 - 12.9 FL    NRBC 0.0 0.0  WBC    ABSOLUTE NRBC 0.00 0.00 - 0.01 K/uL   VANCOMYCIN, RANDOM    Collection Time: 02/14/22  4:43 AM   Result Value Ref Range    Vancomycin, random 12.4 ug/mL   LACTIC ACID    Collection Time: 02/14/22  4:43 AM   Result Value Ref Range    Lactic acid 3.1 (HH) 0.4 - 2.0 mmol/L           Assessment / Plan:  Sepsis:   Likely s/s collecting system infection. Continue zosyn  Discontinue vancomycin  Will follow blood culture and urine culture    Acute UTI  Hx of cystectomy with urostomy for bladder can 2010  Continue antibiotics    Diarrhea  Unclear reason. Improving  Start probiotics    NAPOLEON  Improving with iv fluids. CAD:   Continue home medications PCI in 2010, holding plavix for hematuria  Start plavix as UA shows RBC of 0-5    Type II Diabetes:  Start insulin sliding scale    Anemia:  UA shows RBC of 0-5  Hb dropped from 14.1 to 10.9  Will monitor Hb, some dilutional component as well. 30.0 - 39.9 Obese / Body mass index is 35.52 kg/m². Code status: Full  Prophylaxis: SCDs as there was concern for hematuria. Recommended Disposition: Home w/Family       Time spent 35 minutes involving direct patient care as well as reviewing patient's labs and coordination of care with nursing staff     Care Plan discussed with: Patient/Family/RN/Case Management        Total time spent with patient: 35 minutes.

## 2022-02-14 NOTE — PROGRESS NOTES
Day #2 of Vancomycin    Indication for Antimicrobials: sepsis, UTI     Consult placed by: Dr. Bj Pastor    Significant Cultures:    blood: pending   urine: pending    Labs:  Recent Labs     22  0443 22  1519   CREA 0.83 1.07*   BUN 14 22*   WBC 7.9 13.5*     Temp (24hrs), Av.8 °F (37.7 °C), Min:98.6 °F (37 °C), Max:101.1 °F (38.4 °C)      Is the Patient on Dialysis? No    Creatinine Clearance (mL/min):   CrCl (Actual Body Weight): 100.7  CrCl (Adjusted Body Weight): 74.1  CrCl (Ideal Body Weight): 56.4    Goal level: AUC/LORENA 400-500     Impression/Plan:   Patient's renal function returned to baseline  WBC trending down and within normal limits  Will initiate vancomycin 1000 mg IV q12h  --projected steady state AUC ~528 with trough 16.7  Will get trough level tomorrow AM  Antimicrobial stop date TBD     Pharmacy will follow daily and adjust medications as appropriate for renal function and/or serum levels.     Thank you,  [facility]

## 2022-02-14 NOTE — PROGRESS NOTES
Day #1 of Vancomycin  Consult provided for this 61 y.o. female for indication of sepsis, UTI.   Antibiotic regimen:  Vancomycin + Zosyn  Concomitant nephrotoxic drugs: Contrast given   Frequency of BMP: Not scheduled    Recent Labs     22  1519   WBC 13.5*   CREA 1.07*   BUN 22*     Est CrCl: ~55-60 ml/min  Temp (24hrs), Av.7 °F (38.2 °C), Min:100.5 °F (38.1 °C), Max:101.1 °F (38.4 °C)    Cultures:    Blood: Pending    MRSA Swab: Not ordered, first dose of vancomycin already given    Target range: Trough 10-15 mcg/mL    Assessment/Plan:   Febrile, leukocytosis, lactic acidosis  Baseline SCr unknown, will order a random level tomorrow AM to assess clearance  1000 mg given in ED, will order an additional 500 mg to complete an effective loading dose of 1500 mg  Antimicrobial stop date TBD

## 2022-02-14 NOTE — PROGRESS NOTES
2/14/22. PCP is Dr. Carmina Real. - been a while since last visit. D/C Plan is home with family & sister ( Crystal Rizo @ [phone]) to transport home upon discharge. Pt uses no DMe/no home health @ this time.

## 2022-02-14 NOTE — H&P
History and Physical    Patient: Radha Pina MRN: [id number]  SSN: [id number]    YOB: 1965  Age: 61 y.o. Sex: female      Subjective:      Radha Pina is a 61 y.o. female CAD s/p stent, moyamoya disease, bladder cancer s/p cystectomy with urostomy, diabetes, hypertension, and CVA. She presented to the ED today for 1 day history of diarrhea, and vomiting. She also reports associated fevers and noticing bloody urine in her urostomy bag. Denies abdominal pain, or pain around the ostomy site. She otherwise denies cough, shortness of breath, or sore throat. In the ED, she was febrile at 100.5 °F.,  Hypotensive at 84/60. Also has leukocytosis, WBC 13.5. Initial lactic acid of 2.4. Urinalysis not indicated of UTI, however, CT abdomen showed hydronephrosis of right kidney and findings suggestive of collecting system infectious/inflammatory process. Sepsis protocol was initiated. History reviewed. No pertinent past medical history. No past surgical history on file. History reviewed. No pertinent family history. Social History     Tobacco Use    Smoking status: Not on file    Smokeless tobacco: Not on file   Substance Use Topics    Alcohol use: Not on file      Prior to Admission medications    Medication Sig Start Date End Date Taking? Authorizing Provider   clopidogreL (PLAVIX) 75 mg tab Take 75 mg by mouth daily. Provider, Historical   enalapril (VASOTEC) 2.5 mg tablet Take 2.5 mg by mouth daily. Provider, Historical   lovastatin (MEVACOR) 40 mg tablet Take 40 mg by mouth daily. Provider, Historical   metFORMIN (GLUCOPHAGE) 1,000 mg tablet Take 1,000 mg by mouth two (2) times a day. Provider, Historical   metoprolol succinate (TOPROL-XL) 25 mg XL tablet Take 25 mg by mouth daily.     Provider, Historical        No Known Allergies    Review of Systems:  Constitutional: See HPI  Eyes: No visual disturbance  Ears, Nose, Mouth, Throat, and Face: No nasal congestion, No sore throat  Respiratory: No cough, No sputum, No wheezing, No SOB  Cardiovascular: No chest pain, No lower extremity edema, No Palpitations   Gastrointestinal: See HPI  Genitourinary: No frequency, No dysuria, No hematuria. Integument/Breast: No rash, No skin lesion(s), No dryness  Musculoskeletal: No arthralgias, No neck pain, No back pain  Neurological: No headaches, No dizziness, No confusion,  No seizures  Behavioral/Psychiatric: No anxiety, No depression      Objective:     Vitals:    02/13/22 1450 02/13/22 1529 02/13/22 1745 02/13/22 1947   BP:  109/60  (!) 90/58   Pulse:  88  89   Resp:  17  18   Temp:   (!) 101.1 °F (38.4 °C) (!) 100.5 °F (38.1 °C)   SpO2:  98%  100%   Weight: 85.3 kg (188 lb)      Height: 5' 1\" (1.549 m)           Physical Exam:  General: alert, cooperative, no distress  Eye: conjunctivae/corneas clear. PERRL, EOM's intact. Throat and Neck: normal and no erythema or exudates noted. No mass   Lung: clear to auscultation bilaterally  Heart: regular rate and rhythm,   Abdomen: soft, non-tender. Bowel sounds normal. No masses. Urostomy noted, no prolapse or surrounding skin erythema. Extremities:  able to move all extremities normal, atraumatic  Skin: Normal.  Neurologic: AOx3. Cranial nerves 2-12 and sensation grossly intact.   Psychiatric: non focal    Recent Results (from the past 24 hour(s))   CBC WITH AUTOMATED DIFF    Collection Time: 02/13/22  3:19 PM   Result Value Ref Range    WBC 13.5 (H) 3.6 - 11.0 K/uL    RBC 4.86 3.80 - 5.20 M/uL    HGB 14.1 11.5 - 16.0 g/dL    HCT 43.3 35.0 - 47.0 %    MCV 89.1 80.0 - 99.0 FL    MCH 29.0 26.0 - 34.0 PG    MCHC 32.6 30.0 - 36.5 g/dL    RDW 12.6 11.5 - 14.5 %    PLATELET 945 176 - 344 K/uL    MPV 9.7 8.9 - 12.9 FL    NRBC 0.0 0.0  WBC    ABSOLUTE NRBC 0.00 0.00 - 0.01 K/uL    NEUTROPHILS 92 (H) 32 - 75 %    LYMPHOCYTES 1 (L) 12 - 49 %    MONOCYTES 7 5 - 13 %    EOSINOPHILS 0 0 - 7 %    BASOPHILS 0 0 - 1 %    IMMATURE GRANULOCYTES 0 0 - 0.5 %    ABS. NEUTROPHILS 12.3 (H) 1.8 - 8.0 K/UL    ABS. LYMPHOCYTES 0.2 (L) 0.8 - 3.5 K/UL    ABS. MONOCYTES 0.9 0.0 - 1.0 K/UL    ABS. EOSINOPHILS 0.0 0.0 - 0.4 K/UL    ABS. BASOPHILS 0.0 0.0 - 0.1 K/UL    ABS. IMM. GRANS. 0.1 (H) 0.00 - 0.04 K/UL    DF AUTOMATED     METABOLIC PANEL, COMPREHENSIVE    Collection Time: 02/13/22  3:19 PM   Result Value Ref Range    Sodium 137 136 - 145 mmol/L    Potassium 4.3 3.5 - 5.1 mmol/L    Chloride 105 97 - 108 mmol/L    CO2 25 21 - 32 mmol/L    Anion gap 7 5 - 15 mmol/L    Glucose 193 (H) 65 - 100 mg/dL    BUN 22 (H) 6 - 20 mg/dL    Creatinine 1.07 (H) 0.55 - 1.02 mg/dL    BUN/Creatinine ratio 21 (H) 12 - 20      GFR est AA >60 >60 ml/min/1.73m2    GFR est non-AA 53 (L) >60 ml/min/1.73m2    Calcium 9.4 8.5 - 10.1 mg/dL    Bilirubin, total 0.4 0.2 - 1.0 mg/dL    AST (SGOT) 17 15 - 37 U/L    ALT (SGPT) 20 12 - 78 U/L    Alk.  phosphatase 53 45 - 117 U/L    Protein, total 7.0 6.4 - 8.2 g/dL    Albumin 3.6 3.5 - 5.0 g/dL    Globulin 3.4 2.0 - 4.0 g/dL    A-G Ratio 1.1 1.1 - 2.2     LACTIC ACID    Collection Time: 02/13/22  3:19 PM   Result Value Ref Range    Lactic acid 2.4 (HH) 0.4 - 2.0 mmol/L   TYPE & SCREEN    Collection Time: 02/13/22  3:19 PM   Result Value Ref Range    Crossmatch Expiration 02/16/2022,2359     ABO/Rh(D) Naaman Laming Positive     Antibody screen Negative    URINALYSIS W/MICROSCOPIC    Collection Time: 02/13/22  3:33 PM   Result Value Ref Range    Color Pink      Appearance Turbid (A) Clear      Specific gravity 1.011 1.003 - 1.030      pH (UA) 8.0 5.0 - 8.0      Protein Negative Negative mg/dL    Glucose Negative Negative mg/dL    Ketone Negative Negative mg/dL    Bilirubin Negative Negative      Blood Negative Negative      Urobilinogen 0.1 0.1 - 1.0 EU/dL    Nitrites Positive (A) Negative      Leukocyte Esterase Small (A) Negative      WBC 5-10 0 - 4 /hpf    RBC 0-5 0 - 5 /hpf    Bacteria Negative Negative /hpf    Mucus Trace /lpf   COVID-19 RAPID TEST Collection Time: 02/13/22  4:14 PM   Result Value Ref Range    Specimen source Nasopharyngeal      COVID-19 rapid test Not Detected Not Detected     LACTIC ACID    Collection Time: 02/13/22  6:25 PM   Result Value Ref Range    Lactic acid 3.6 (HH) 0.4 - 2.0 mmol/L       XR Results (maximum last 3): Results from [place] encounter on 02/13/22    XR CHEST PORT    Narrative  Chest, AP portable    COMPARISON: None recent. The heart, mediastinum and pulmonary vasculature appear normal.  The lungs are  clear. Impression  No acute process, given limitations of portable technique. CT Results (maximum last 3): Results from [place] encounter on 02/13/22    CT ABD PELV W WO CONT    Narrative  CT abdomen and pelvis without and with IV contrast.    Comparison CT abdomen and pelvis November 19, 2014. Axial images are reviewed along with reformatted sagittal/coronal images. 100 mL  Isovue 370 administered. Dose reduction: All CT scans at this facility are performed using dose reduction  optimization techniques as appropriate to a performed exam including the  following-  automated exposure control, adjustments of mA and/or Kv according to patient  size, or use of iterative reconstructive technique. Lung bases are clear. Nonenhanced and enhanced images demonstrate similar prominence left upper renal  collecting system. Duplex right kidney with lower moiety hydronephrosis advanced compared to prior  imaging. No definite calcified stone. Venous phase images demonstrate normal enhancement for the liver. Mild  distention of gallbladder. Pancreas, spleen, and bilateral adrenal glands appear  unremarkable. Stomach and small bowel loops are fluid-filled but not appreciably distended. Right lower quadrant ileal pouch noted with small quantity nondependent air. Appendix unremarkable. Stool and air through colon. No ascites. Prior hysterectomy.     Atherosclerotic change normal caliber abdominal aorta. Impression  Similar prominence left renal collecting system. Lower moiety hydronephrosis right kidney advanced compared to prior imaging. No definite calcified stone. Ileal pouch with small quantity nondependent air. Consider collecting system infectious/inflammatory process. Exact cause for hematuria undetermined. MRI Results (maximum last 3): No results found for this or any previous visit. Nuclear Medicine Results (maximum last 3): No results found for this or any previous visit. US Results (maximum last 3): No results found for this or any previous visit. Assessment:   #Sepsis  #Diarrhea and vomiting  #Bladder cancer with urostomy  #CAD s/p stent  #moyamoya disease  #bladder cancer s/p cystectomy with urostomy  #diabetes  #hypertension  #History of CVA. Plan:     # Sepsis  -Likely secondary to collecting system infection, unable to rule out GI infection.   -Blood culture and urine culture obtained  -Fluid resuscitation.   -Empirical antibiotics: Vancomycin and Zosyn  -Consult ID. #Diarrhea and vomiting  -Fluid resuscitation and antibiotics as above  -Send stool WBC. -Suspect gastroenteritis, should self resolve. #Bladder cancer with urostomy  -Consult neurology for further recommendation. #CAD  -Continue home medications. -PCI in 2010, hold plavix for now due to hematuria. #Diabetes  -Continue home medications. Patient sister unsure of Januvia dose, will reconcile tomorrow. -Monitor blood sugar, start SSI if required.     #Hypertension  -Continue home medications        Signed By: Avril Fair MD     February 13, 2022

## 2022-02-14 NOTE — CONSULTS
Infectious Disease Consult Note    Reason for Consult: Sepsis   Date of Consultation: February 14, 2022  Date of Admission: 2/13/2022  Referring Physician: Hospitalist       HPI: Guillermina 61-y.o female admitted last night with sepsis due to UTI for which ID has been consulted. Her medical history is significant for bladder CA, diagnosed in 2010 s/p cystectomy with urostomy, DM, HTN, CVA, and Moyamoya disease. She denies a h/o recurrent UTI, does not recall when she was last diagnosed with UTI. She notes a 1 day h/o diarrhea, vomiting, subjective fever/chills and hematuria. She denies abdominal pain or sick contacts. Assessment the ED showed she was febrile with a T-max of 101.0F, and hypotensive but not tachycardic. Her BP improved with fluids, and she reported feeling much better during my assessment. Initial labs showed a leukocytosis of 13.5 which has trended down to 7.9 on today's labs. Her UA is negative for pyuria and bacteriuria but positive for nitrites and leukocyte esterase. Lactic acid was elevated at 3.6, trended down to 3.1. Blood and urine Cx from 2/13 are pending. CT ab/pel revealed prominence in the left renal collecting system mild right hydronephrosis, no nephrolithiasis, she is on Vanc and Zosyn, denies any antibiotic allergies. Pt was seen in the ED awaiting transfer to the floors.      Review of Systems:     Gen: Negative for chills, fevers, weight loss, weight gain   CV:  Negative for chest pain, dyspnea on exertion, leg edema   Lungs: Negative for shortness of breath, cough, wheezing   Abdomen: abdominal pain, nausea, vomiting, diarrhea   Genitourinary: Negative for genital pain or genital discharge     Neuro: Negative for headache, numbness, tingling, extremity weakness   Skin: Negative for rash, sores/open wounds   Musculoskeletal: Negative for joint pain, joint swelling, joint erythema    Psych: Negative for manic behavior       Past Medical History: Per HPI     Past surgical history: H/o Cystectomy/urostomy    Social History   Social History     Tobacco Use    Smoking status: Not on file    Smokeless tobacco: Not on file   Substance Use Topics    Alcohol use: Not on file    Drug use: Not on file        Family history   History reviewed. No pertinent family history. Allergies:  No Known Allergies      Medications:  No current facility-administered medications on file prior to encounter. Current Outpatient Medications on File Prior to Encounter   Medication Sig Dispense Refill    clopidogreL (PLAVIX) 75 mg tab Take 75 mg by mouth daily.  enalapril (VASOTEC) 2.5 mg tablet Take 2.5 mg by mouth daily.  lovastatin (MEVACOR) 40 mg tablet Take 40 mg by mouth daily.  metFORMIN (GLUCOPHAGE) 1,000 mg tablet Take 1,000 mg by mouth two (2) times a day.  metoprolol succinate (TOPROL-XL) 25 mg XL tablet Take 25 mg by mouth daily.          Physical Exam:    Vitals:   Patient Vitals for the past 24 hrs:   Temp Pulse Resp BP SpO2   02/14/22 0446 98.6 °F (37 °C) 88 16 (!) 121/50 100 %   02/14/22 0035 99 °F (37.2 °C) 93 16 121/62 100 %   02/13/22 2202 99.1 °F (37.3 °C) 91 17 (!) 108/52 100 %   02/13/22 1947 (!) 100.5 °F (38.1 °C) 89 18 (!) 90/58 100 %   02/13/22 1745 (!) 101.1 °F (38.4 °C) -- -- -- --   02/13/22 1529 -- 88 17 109/60 98 %   02/13/22 1448 (!) 100.6 °F (38.1 °C) 91 18 (!) 84/60 99 %   ·   · GEN: NAD, AAO x 4  · HEENT: NCAT, PERRLA  · HEART: S1, S2+, RRR, No murmur   · Lungs: CTA B/l, decreased at the bases, no wheeze/rhonchi   · Abdomen: soft, mildly distended, NT, +BS   · Genitourinary: +urostomy   · Extremities: no edema  · Skin: no chronic wounds or ulcers     Labs:   Recent Labs     02/14/22  0443 02/13/22  1519   WBC 7.9 13.5*   HGB 10.9* 14.1   HCT 34.2* 43.3    347     Recent Labs     02/14/22  0443 02/13/22  1519   BUN 14 22*   CREA 0.83 1.07*       Microbiology Data:  - Blood Cx 02/13: Pending   - Urine Cx 02/13: Pending     Imaging:   CT abdo/pel 02/13: Similar prominence left renal collecting system. Lower moiety hydronephrosis right kidney advanced compared to prior imaging. No definite calcified stone. Ileal pouch with small quantity nondependent air. Consider collecting system infectious/inflammatory process. Assessment / Plan:     1. Sepsis on Admission, likely from  source. Febrile on presentation, hypotensive, mildly elevated lactic acid       Abnormal UA, blood and urine Cxs are pending       WBC normalized on todays labs, lactic acid trending down       Continue on Zosyn for GNR infection, will dc Vanc for now       Routine labs in the morning     2. UTI, h/o cystectomy w urostomy for bladder CA in 2010, denies h/o recurrent UTI       Reported hematuria on admission, UA only remarkable to leukocyte esterase and nitrite      Mild right hydronephrosis and evidence of collecting system infection on CT       No abdominal tenderness elicited on exam. Continue on Zosyn pending urine Cx     3. Diarrhea on presentation, reason unclear, noted decreased BM frequency during my assessment       Currently a low suspicion for C.diff but will consider testing if increase frequency, start probiotics    4. Acute renal failure due to sepsis syndrome/dehydration, resolved, Cr at baseline     5.  H/o CVA, and Moyamoya disease per records     Roge Mcfarland MD     2/14/2022

## 2022-02-14 NOTE — ED NOTES
Pt provided water and snacks. Family updated on plan of care. Apolinar Begum MD at bedside assessing pt.

## 2022-02-14 NOTE — ED NOTES
Pt ambulatory to restroom with steady gait. Educated on need for stool sample. Pt reports inability to have bowel movement at this time. NAD noted.

## 2022-02-14 NOTE — PROGRESS NOTES
Reason for Admission:  Sepsis                     RUR Score:  7%                   Plan for utilizing home health: None @ this time/uses no DME. PCP: First and Last name:  Norma Miranda MD     Name of Practice:    Are you a current patient: Yes/No: Yes   Approximate date of last visit: Norah Morales a jacqui. Can you participate in a virtual visit with your PCP: Yes/Call                    Current Advanced Directive/Advance Care Plan: Full Code      Healthcare Decision Maker:              Primary Decision Maker: Skyla Montes De [facility] - [facility] - [phone]                  Transition of Care Plan:  D/C Plan is home with family & a family member to transport home upon discharge.

## 2022-02-15 LAB
ANION GAP SERPL CALC-SCNC: 5 MMOL/L (ref 5–15)
BUN SERPL-MCNC: 10 MG/DL (ref 6–20)
BUN/CREAT SERPL: 14 (ref 12–20)
CA-I BLD-MCNC: 8.3 MG/DL (ref 8.5–10.1)
CHLORIDE SERPL-SCNC: 115 MMOL/L (ref 97–108)
CO2 SERPL-SCNC: 23 MMOL/L (ref 21–32)
CREAT SERPL-MCNC: 0.74 MG/DL (ref 0.55–1.02)
ERYTHROCYTE [DISTWIDTH] IN BLOOD BY AUTOMATED COUNT: 12.6 % (ref 11.5–14.5)
EST. AVERAGE GLUCOSE BLD GHB EST-MCNC: 114 MG/DL
GLUCOSE BLD STRIP.AUTO-MCNC: 103 MG/DL (ref 65–117)
GLUCOSE BLD STRIP.AUTO-MCNC: 106 MG/DL (ref 65–117)
GLUCOSE BLD STRIP.AUTO-MCNC: 106 MG/DL (ref 65–117)
GLUCOSE BLD STRIP.AUTO-MCNC: 126 MG/DL (ref 65–117)
GLUCOSE SERPL-MCNC: 107 MG/DL (ref 65–100)
HBA1C MFR BLD: 5.6 % (ref 4–5.6)
HCT VFR BLD AUTO: 34 % (ref 35–47)
HGB BLD-MCNC: 11 G/DL (ref 11.5–16)
MCH RBC QN AUTO: 28.9 PG (ref 26–34)
MCHC RBC AUTO-ENTMCNC: 32.4 G/DL (ref 30–36.5)
MCV RBC AUTO: 89.5 FL (ref 80–99)
NRBC # BLD: 0 K/UL (ref 0–0.01)
NRBC BLD-RTO: 0 PER 100 WBC
PERFORMED BY, TECHID: ABNORMAL
PERFORMED BY, TECHID: NORMAL
PLATELET # BLD AUTO: 218 K/UL (ref 150–400)
PMV BLD AUTO: 10.1 FL (ref 8.9–12.9)
POTASSIUM SERPL-SCNC: 3.3 MMOL/L (ref 3.5–5.1)
RBC # BLD AUTO: 3.8 M/UL (ref 3.8–5.2)
SODIUM SERPL-SCNC: 143 MMOL/L (ref 136–145)
WBC # BLD AUTO: 7.8 K/UL (ref 3.6–11)

## 2022-02-15 PROCEDURE — 82962 GLUCOSE BLOOD TEST: CPT

## 2022-02-15 PROCEDURE — 99232 SBSQ HOSP IP/OBS MODERATE 35: CPT | Performed by: INTERNAL MEDICINE

## 2022-02-15 PROCEDURE — 65270000029 HC RM PRIVATE

## 2022-02-15 PROCEDURE — 74011000258 HC RX REV CODE- 258: Performed by: INTERNAL MEDICINE

## 2022-02-15 PROCEDURE — 74011250637 HC RX REV CODE- 250/637: Performed by: INTERNAL MEDICINE

## 2022-02-15 PROCEDURE — 74011636637 HC RX REV CODE- 636/637: Performed by: INTERNAL MEDICINE

## 2022-02-15 PROCEDURE — 36415 COLL VENOUS BLD VENIPUNCTURE: CPT

## 2022-02-15 PROCEDURE — 74011250636 HC RX REV CODE- 250/636: Performed by: INTERNAL MEDICINE

## 2022-02-15 PROCEDURE — 85027 COMPLETE CBC AUTOMATED: CPT

## 2022-02-15 PROCEDURE — 80048 BASIC METABOLIC PNL TOTAL CA: CPT

## 2022-02-15 RX ADMIN — PIPERACILLIN AND TAZOBACTAM 3.38 G: 3; .375 INJECTION, POWDER, LYOPHILIZED, FOR SOLUTION INTRAVENOUS at 08:50

## 2022-02-15 RX ADMIN — CLOPIDOGREL BISULFATE 75 MG: 75 TABLET ORAL at 08:50

## 2022-02-15 RX ADMIN — INSULIN GLARGINE 7 UNITS: 100 INJECTION, SOLUTION SUBCUTANEOUS at 22:00

## 2022-02-15 RX ADMIN — PIPERACILLIN AND TAZOBACTAM 3.38 G: 3; .375 INJECTION, POWDER, LYOPHILIZED, FOR SOLUTION INTRAVENOUS at 16:23

## 2022-02-15 RX ADMIN — ATORVASTATIN CALCIUM 10 MG: 10 TABLET, FILM COATED ORAL at 21:59

## 2022-02-15 RX ADMIN — METOPROLOL SUCCINATE 25 MG: 25 TABLET, EXTENDED RELEASE ORAL at 08:50

## 2022-02-15 RX ADMIN — PIPERACILLIN AND TAZOBACTAM 3.38 G: 3; .375 INJECTION, POWDER, LYOPHILIZED, FOR SOLUTION INTRAVENOUS at 23:59

## 2022-02-15 RX ADMIN — ENALAPRIL MALEATE 2.5 MG: 2.5 TABLET ORAL at 08:50

## 2022-02-15 NOTE — PROGRESS NOTES
Hospitalist Progress Note    Subjective:   Daily Progress Note: 2/15/2022 2:18 PM    Hospital Course:  Kala Vaughan is a 61 y.o. female CAD s/p stent, moyamoya disease, bladder cancer s/p cystectomy with urostomy, diabetes, hypertension, and CVA. She presented to the ED  for 1 day history of diarrhea, and vomiting. She also reports associated fevers and noticing bloody urine in her urostomy bag. Denies abdominal pain, or pain around the ostomy site. She otherwise denies cough, shortness of breath, or sore throat. Pt started on IV antibiotics for abnormal UA, Infectious disease consulted for management. Subjective: Pt seen in the room, sister at bedside. No complaints of pain or shortness of breath.      Current Facility-Administered Medications   Medication Dose Route Frequency    glucose chewable tablet 16 g  4 Tablet Oral PRN    glucagon (GLUCAGEN) injection 1 mg  1 mg IntraMUSCular PRN    insulin glargine (LANTUS) injection 7 Units  7 Units SubCUTAneous QHS    insulin lispro (HUMALOG) injection   SubCUTAneous TIDAC    dextrose 10% infusion 125-250 mL  125-250 mL IntraVENous PRN    clopidogreL (PLAVIX) tablet 75 mg  75 mg Oral DAILY    piperacillin-tazobactam (ZOSYN) 3.375 g in 0.9% sodium chloride (MBP/ADV) 100 mL MBP  3.375 g IntraVENous Q8H    sodium chloride (NS) flush 5-40 mL  5-40 mL IntraVENous PRN    acetaminophen (TYLENOL) tablet 650 mg  650 mg Oral Q6H PRN    Or    acetaminophen (TYLENOL) suppository 650 mg  650 mg Rectal Q6H PRN    polyethylene glycol (MIRALAX) packet 17 g  17 g Oral DAILY PRN    ondansetron (ZOFRAN ODT) tablet 4 mg  4 mg Oral Q8H PRN    Or    ondansetron (ZOFRAN) injection 4 mg  4 mg IntraVENous Q6H PRN    enalapril (VASOTEC) tablet 2.5 mg  2.5 mg Oral DAILY    atorvastatin (LIPITOR) tablet 10 mg  10 mg Oral QHS    metoprolol succinate (TOPROL-XL) XL tablet 25 mg  25 mg Oral DAILY        Review of Systems:    Review of Systems   Constitutional: Negative for chills and fever. Respiratory: Negative for cough and shortness of breath. Cardiovascular: Negative for chest pain and palpitations. Gastrointestinal: Negative for heartburn, nausea and vomiting. Genitourinary: Negative for dysuria and urgency. Neurological: Negative for dizziness and headaches. Objective:     Visit Vitals  BP (!) 126/55   Pulse 63   Temp 98.1 °F (36.7 °C)   Resp 16   Ht 5' 1\" (1.549 m)   Wt 85.3 kg (188 lb)   SpO2 97%   BMI 35.52 kg/m²      O2 Device: None (Room air)    Temp (24hrs), Av.3 °F (36.8 °C), Min:97.7 °F (36.5 °C), Max:98.8 °F (37.1 °C)      No intake/output data recorded.  1901 - 02/15 0700  In: 4050 [P.O.:500; I.V.:3550]  Out: -     PHYSICAL EXAM:    Physical Exam  Constitutional:       General: She is not in acute distress. Cardiovascular:      Rate and Rhythm: Normal rate and regular rhythm. Pulses: Normal pulses. Heart sounds: Normal heart sounds. Pulmonary:      Breath sounds: Normal breath sounds. Abdominal:      Palpations: Abdomen is soft. Genitourinary:     Comments: Right urostomy,ileo-conduit stoma pink, appliance intact, clear yellow urine  Musculoskeletal:         General: Normal range of motion. Skin:     General: Skin is warm and dry. Neurological:      Mental Status: She is oriented to person, place, and time.    Psychiatric:         Mood and Affect: Mood normal.         Behavior: Behavior normal.            Data Review    Recent Results (from the past 24 hour(s))   GLUCOSE, POC    Collection Time: 22  4:02 PM   Result Value Ref Range    Glucose (POC) 123 (H) 65 - 117 mg/dL    Performed by [facility]    METABOLIC PANEL, BASIC    Collection Time: 02/15/22  7:32 AM   Result Value Ref Range    Sodium 143 136 - 145 mmol/L    Potassium 3.3 (L) 3.5 - 5.1 mmol/L    Chloride 115 (H) 97 - 108 mmol/L    CO2 23 21 - 32 mmol/L    Anion gap 5 5 - 15 mmol/L    Glucose 107 (H) 65 - 100 mg/dL    BUN 10 6 - 20 mg/dL Creatinine 0.74 0.55 - 1.02 mg/dL    BUN/Creatinine ratio 14 12 - 20      GFR est AA >60 >60 ml/min/1.73m2    GFR est non-AA >60 >60 ml/min/1.73m2    Calcium 8.3 (L) 8.5 - 10.1 mg/dL   CBC W/O DIFF    Collection Time: 02/15/22  7:32 AM   Result Value Ref Range    WBC 7.8 3.6 - 11.0 K/uL    RBC 3.80 3.80 - 5.20 M/uL    HGB 11.0 (L) 11.5 - 16.0 g/dL    HCT 34.0 (L) 35.0 - 47.0 %    MCV 89.5 80.0 - 99.0 FL    MCH 28.9 26.0 - 34.0 PG    MCHC 32.4 30.0 - 36.5 g/dL    RDW 12.6 11.5 - 14.5 %    PLATELET 051 166 - 984 K/uL    MPV 10.1 8.9 - 12.9 FL    NRBC 0.0 0.0  WBC    ABSOLUTE NRBC 0.00 0.00 - 0.01 K/uL   GLUCOSE, POC    Collection Time: 02/15/22  8:25 AM   Result Value Ref Range    Glucose (POC) 103 65 - 117 mg/dL    Performed by Tamy Diane, POC    Collection Time: 02/15/22 10:52 AM   Result Value Ref Range    Glucose (POC) 106 65 - 117 mg/dL    Performed by Mikie Pollard        CT ABD PELV W WO CONT   Final Result   Similar prominence left renal collecting system. Lower moiety hydronephrosis right kidney advanced compared to prior imaging. No definite calcified stone. Ileal pouch with small quantity nondependent air. Consider collecting system infectious/inflammatory process. Exact cause for hematuria undetermined. XR CHEST PORT   Final Result   No acute process, given limitations of portable technique. Active Problems:    Sepsis ([address] (2/13/2022)        Assessment/Plan: 1. Sepsis- secondary to UTI, ID following, on IV zosyn, URC pending final results, GNR  BC no growth so far. LA at 3.0.     2. Hypokalemia- 40 meq KCL x 1 dose today. 3. [place]- resolved, creatinine normal.    4. Diarrhea- ? R/to abx, continue probiotics    5. Discharge plan- home when clinically stable.              DVT Prophylaxis: sequential compression  Code Status:  Full Code  POA: [place] [id number]  [facility] discussed with:   ____patient, staff nurse, family___________________________________________________________    [facility], NP

## 2022-02-15 NOTE — PROGRESS NOTES
Problem: Falls - Risk of  Goal: *Absence of Falls  Description: Document Nishant Madrid Fall Risk and appropriate interventions in the flowsheet.   Outcome: Progressing Towards Goal  Note: Fall Risk Interventions:

## 2022-02-15 NOTE — PROGRESS NOTES
Bedside shift change report given to Sandie Osuna (oncoming nurse) by Yon Casper (offgoing nurse). Report included the following information SBAR.

## 2022-02-15 NOTE — PROGRESS NOTES
Problem: Falls - Risk of  Goal: *Absence of Falls  Description: Document Deysi Haque Fall Risk and appropriate interventions in the flowsheet.   Outcome: Progressing Towards Goal  Note: Fall Risk Interventions:            Medication Interventions: Patient to call before getting OOB

## 2022-02-15 NOTE — PROGRESS NOTES
Infectious Disease Progress Note             Subjective:   Pt seen and examined at bedside. States she feels much, denies increased BM frequency. No fever/chills   Objective:   Physical Exam:     Visit Vitals  /66 (BP 1 Location: Right upper arm, BP Patient Position: At rest)   Pulse 67   Temp 98.8 °F (37.1 °C)   Resp 16   Ht 5' 1\" (1.549 m)   Wt 188 lb (85.3 kg)   SpO2 96%   BMI 35.52 kg/m²      O2 Device: None (Room air)    Temp (24hrs), Av.3 °F (36.8 °C), Min:97.7 °F (36.5 °C), Max:98.8 °F (37.1 °C)    No intake/output data recorded.  1901 - 02/15 0700  In: 4050 [P.O.:500;  I.V.:3550]  Out: -     General: NAD, AAO x 4  HEENT: MERCY, Moist mucosa   Lungs: CTA b/l, no wheeze/rhonchi   Heart: S1S2+, RRR, no murmur  Abdo: Soft, NT, ND, +BS   : No mazariegos cath   Exts: No edema, + pulses b/l   Skin: No wounds, No rashes or lesions    Data Review:       Recent Days:  Recent Labs     02/15/22  0732 22  0443 22  1519   WBC 7.8 7.9 13.5*   HGB 11.0* 10.9* 14.1   HCT 34.0* 34.2* 43.3    231 347     Recent Labs     02/15/22  0732 22  0443 22  1519   BUN 10 14 22*   CREA 0.74 0.83 1.07*       No results found for: CRPQN, CRP, CRPM       Microbiology     Results     Procedure Component Value Units Date/Time    CULTURE, URINE [[id number]]  (Abnormal) Collected: 22    Order Status: Completed Specimen: Urine Updated: 02/15/22 0900     Special Requests: No Special Requests        [place] Count --        >100,000  colonies/ml       Culture result: Gram Negative Rods       CULTURE, URINE [[id number]]     Order Status: Canceled Specimen: Urine from Clean catch     COVID-19 RAPID TEST [[id number]] Collected: 22 1614    Order Status: Completed Specimen: Nasopharyngeal Updated: 22 1718     Specimen source Nasopharyngeal        COVID-19 rapid test Not Detected        Comment: Rapid Abbott ID Now   Rapid NAAT:  The specimen is NEGATIVE for SARS-CoV-2, the novel coronavirus associated with COVID-19. Negative results should be treated as presumptive and, if inconsistent with clinical signs and symptoms or necessary for patient management, should be tested with an alternative molecular assay. Negative results do not preclude SARS-CoV-2 infection and should not be used as the sole basis for patient management decisions. This test has been authorized by the FDA under   an Emergency Use Authorization (EUA) for use by authorized laboratories. Fact sheet for Healthcare Providers: Convention[employer]ub[employer].co.nz Fact sheet for Patients: Convention[employer]nz   Methodology: Isothermal Nucleic Acid Amplification         CULTURE, BLOOD, PAIRED [[id number]] Collected: 02/13/22 1519    Order Status: Completed Specimen: Blood Updated: 02/15/22 0871     Special Requests: No Special Requests        Culture result: No growth 1 day                Diagnostics   CXR Results  (Last 48 hours)    None             Assessment/Plan     1. Sepsis on admission from  source, E.coli isolated from urine Cx, blood Cx neg       Afebrile w a normal WBC on routine labs       Continue on Zosyn on pending ID GNR       Routine labs in the morning      2. UTI, h/o cystectomy w urostomy for bladder CA in 2010, denies h/o recurrent UTI       Mild right hydronephrosis and evidence of collecting system infection on CT       GNR isolated from urine Cx, continue on Zosyn pending final Cx      3.  Diarrhea on presentation, reason unclear, resolving, low suspicion for C.diff       Continue probiotics while on antibiotics      4. Acute renal failure, resolved, Cr at baseline      5. H/o CVA, and Moyamoya disease per records      Jesús Burnham MD    2/15/2022

## 2022-02-15 NOTE — PROGRESS NOTES
Spiritual Care Assessment/Progress Note  [facility]      NAME: Mikki Graff      MRN: [id number]  AGE: 61 y.o. SEX: female  Temple Affiliation: Jaky Spears   Language: English     2/15/2022     Total Time (in minutes): 13     Spiritual Assessment begun in [facility] SURG[facility] through conversation with:         [x]Patient        [x] Family    [] Friend(s)        Reason for Consult: Request by patient     Spiritual beliefs: (Please include comment if needed)     [x] Identifies with a mike tradition:  Jaky Spears       [] Supported by a mike community:            [] Claims no spiritual orientation:           [] Seeking spiritual identity:                [] Adheres to an individual form of spirituality:           [] Not able to assess:                           Identified resources for coping:      [] Prayer                               [] Music                  [] Guided Imagery     [x] Family/friends                 [] Pet visits     [] Devotional reading                         [] Unknown     [] Other:                                              Interventions offered during this visit: (See comments for more details)    Patient Interventions: Affirmation of emotions/emotional suffering,Bridging,Catharsis/review of pertinent events in supportive environment,Initial/Spiritual assessment, patient floor,Normalization of emotional/spiritual concerns,Reframing     Family/Friend(s):  Affirmation of emotions/emotional suffering,Affirmation of mike,Bridging,Catharsis/review of pertinent events in supportive environment,Initial Assessment,Normalization of emotional/spiritual concerns     Plan of Care:     [] Support spiritual and/or cultural needs    [] Support AMD and/or advance care planning process      [] Support grieving process   [] Coordinate Rites and/or Rituals    [] Coordination with community clergy   [] No spiritual needs identified at this time   [] Detailed Plan of Care below (See Comments)  [] Make referral to Music Therapy  [] Make referral to Pet Therapy     [] Make referral to Addiction services  [] Make referral to [facility]  [] Make referral to Spiritual Care Partner  [] No future visits requested        [x] Contact Spiritual Care for further referrals     Comments:  Visited patient in [address] [place] [address] for spiritual care per patient's request during admission. Patient and her sister, who is also her caretaker, were present during the visit. They were about to go for a walk together. Patient does not recall requesting 's visit but expressed gratitude for visiting. Sister stated that her sister is a believer. Provided chaplaincy education and listened with empathy and reflection while exploring their needs. Normalized patient's response and affirmed their supportive relationship as well as their health care priorities for the patient. Advised of  availability. Contact chaplains for further referrals. Chaplain Rock Bob M.Div.    can be reached by calling the  at [facility]  [phone]

## 2022-02-16 VITALS
DIASTOLIC BLOOD PRESSURE: 66 MMHG | SYSTOLIC BLOOD PRESSURE: 119 MMHG | WEIGHT: 188 LBS | TEMPERATURE: 98.2 F | HEIGHT: 61 IN | OXYGEN SATURATION: 98 % | HEART RATE: 77 BPM | RESPIRATION RATE: 16 BRPM | BODY MASS INDEX: 35.5 KG/M2

## 2022-02-16 LAB
ANION GAP SERPL CALC-SCNC: 6 MMOL/L (ref 5–15)
BACTERIA SPEC CULT: ABNORMAL
BACTERIA SPEC CULT: ABNORMAL
BUN SERPL-MCNC: 5 MG/DL (ref 6–20)
BUN/CREAT SERPL: 8 (ref 12–20)
CA-I BLD-MCNC: 8.3 MG/DL (ref 8.5–10.1)
CHLORIDE SERPL-SCNC: 113 MMOL/L (ref 97–108)
CO2 SERPL-SCNC: 25 MMOL/L (ref 21–32)
COLONY COUNT,CNT: ABNORMAL
CREAT SERPL-MCNC: 0.62 MG/DL (ref 0.55–1.02)
ERYTHROCYTE [DISTWIDTH] IN BLOOD BY AUTOMATED COUNT: 12.4 % (ref 11.5–14.5)
GLUCOSE BLD STRIP.AUTO-MCNC: 112 MG/DL (ref 65–117)
GLUCOSE BLD STRIP.AUTO-MCNC: 200 MG/DL (ref 65–117)
GLUCOSE SERPL-MCNC: 96 MG/DL (ref 65–100)
HCT VFR BLD AUTO: 34.1 % (ref 35–47)
HGB BLD-MCNC: 10.9 G/DL (ref 11.5–16)
MCH RBC QN AUTO: 28.2 PG (ref 26–34)
MCHC RBC AUTO-ENTMCNC: 32 G/DL (ref 30–36.5)
MCV RBC AUTO: 88.3 FL (ref 80–99)
NRBC # BLD: 0 K/UL (ref 0–0.01)
NRBC BLD-RTO: 0 PER 100 WBC
PERFORMED BY, TECHID: ABNORMAL
PERFORMED BY, TECHID: NORMAL
PLATELET # BLD AUTO: 222 K/UL (ref 150–400)
PMV BLD AUTO: 10.3 FL (ref 8.9–12.9)
POTASSIUM SERPL-SCNC: 3.2 MMOL/L (ref 3.5–5.1)
RBC # BLD AUTO: 3.86 M/UL (ref 3.8–5.2)
SODIUM SERPL-SCNC: 144 MMOL/L (ref 136–145)
SPECIAL REQUESTS,SREQ: ABNORMAL
WBC # BLD AUTO: 7.4 K/UL (ref 3.6–11)

## 2022-02-16 PROCEDURE — 74011636637 HC RX REV CODE- 636/637: Performed by: INTERNAL MEDICINE

## 2022-02-16 PROCEDURE — 74011250637 HC RX REV CODE- 250/637: Performed by: INTERNAL MEDICINE

## 2022-02-16 PROCEDURE — 80048 BASIC METABOLIC PNL TOTAL CA: CPT

## 2022-02-16 PROCEDURE — 74011250636 HC RX REV CODE- 250/636: Performed by: INTERNAL MEDICINE

## 2022-02-16 PROCEDURE — 36415 COLL VENOUS BLD VENIPUNCTURE: CPT

## 2022-02-16 PROCEDURE — 82962 GLUCOSE BLOOD TEST: CPT

## 2022-02-16 PROCEDURE — 74011000258 HC RX REV CODE- 258: Performed by: INTERNAL MEDICINE

## 2022-02-16 PROCEDURE — 74011250637 HC RX REV CODE- 250/637: Performed by: STUDENT IN AN ORGANIZED HEALTH CARE EDUCATION/TRAINING PROGRAM

## 2022-02-16 PROCEDURE — 85027 COMPLETE CBC AUTOMATED: CPT

## 2022-02-16 RX ORDER — POTASSIUM CHLORIDE 750 MG/1
20 TABLET, FILM COATED, EXTENDED RELEASE ORAL
Status: COMPLETED | OUTPATIENT
Start: 2022-02-16 | End: 2022-02-16

## 2022-02-16 RX ORDER — POTASSIUM CHLORIDE 750 MG/1
40 TABLET, FILM COATED, EXTENDED RELEASE ORAL
Status: DISCONTINUED | OUTPATIENT
Start: 2022-02-16 | End: 2022-02-16 | Stop reason: HOSPADM

## 2022-02-16 RX ADMIN — POTASSIUM CHLORIDE 20 MEQ: 750 TABLET, FILM COATED, EXTENDED RELEASE ORAL at 08:50

## 2022-02-16 RX ADMIN — CLOPIDOGREL BISULFATE 75 MG: 75 TABLET ORAL at 08:50

## 2022-02-16 RX ADMIN — ENALAPRIL MALEATE 2.5 MG: 2.5 TABLET ORAL at 08:50

## 2022-02-16 RX ADMIN — INSULIN LISPRO 3 UNITS: 100 INJECTION, SOLUTION INTRAVENOUS; SUBCUTANEOUS at 11:30

## 2022-02-16 RX ADMIN — METOPROLOL SUCCINATE 25 MG: 25 TABLET, EXTENDED RELEASE ORAL at 08:50

## 2022-02-16 RX ADMIN — PIPERACILLIN AND TAZOBACTAM 3.38 G: 3; .375 INJECTION, POWDER, LYOPHILIZED, FOR SOLUTION INTRAVENOUS at 08:50

## 2022-02-16 NOTE — PROGRESS NOTES
Problem: Falls - Risk of  Goal: *Absence of Falls  Description: Document Clearence Skates Fall Risk and appropriate interventions in the flowsheet.   Outcome: Progressing Towards Goal  Note: Fall Risk Interventions:            Medication Interventions: Patient to call before getting OOB

## 2022-02-16 NOTE — ROUTINE PROCESS
Patient's POA Caroline (sister) wants to leave AMA. [address] [facility] notified. Caroline was made aware of risks and benefits to staying. Caroline stated she has made appointments for the patient to see their urologist and PCP and they will definitely be leaving today. [place] paperwork was signed by Reese Parada. IV was taken out and telemetry box was taken off the patient.

## 2022-02-16 NOTE — PROGRESS NOTES
Problem: Falls - Risk of  Goal: *Absence of Falls  Description: Document Elvis Marina Fall Risk and appropriate interventions in the flowsheet.   Outcome: Progressing Towards Goal  Note: Fall Risk Interventions:            Medication Interventions: Patient to call before getting OOB

## 2022-02-16 NOTE — PROGRESS NOTES
Hospitalist Progress Note    Subjective:   Daily Progress Note: 2022 7:47 AM    Patient is feeling good. No complaints of SOB, CP, or fever. Current Facility-Administered Medications   Medication Dose Route Frequency    glucose chewable tablet 16 g  4 Tablet Oral PRN    glucagon (GLUCAGEN) injection 1 mg  1 mg IntraMUSCular PRN    insulin glargine (LANTUS) injection 7 Units  7 Units SubCUTAneous QHS    insulin lispro (HUMALOG) injection   SubCUTAneous TIDAC    dextrose 10% infusion 125-250 mL  125-250 mL IntraVENous PRN    clopidogreL (PLAVIX) tablet 75 mg  75 mg Oral DAILY    piperacillin-tazobactam (ZOSYN) 3.375 g in 0.9% sodium chloride (MBP/ADV) 100 mL MBP  3.375 g IntraVENous Q8H    sodium chloride (NS) flush 5-40 mL  5-40 mL IntraVENous PRN    acetaminophen (TYLENOL) tablet 650 mg  650 mg Oral Q6H PRN    Or    acetaminophen (TYLENOL) suppository 650 mg  650 mg Rectal Q6H PRN    polyethylene glycol (MIRALAX) packet 17 g  17 g Oral DAILY PRN    ondansetron (ZOFRAN ODT) tablet 4 mg  4 mg Oral Q8H PRN    Or    ondansetron (ZOFRAN) injection 4 mg  4 mg IntraVENous Q6H PRN    enalapril (VASOTEC) tablet 2.5 mg  2.5 mg Oral DAILY    atorvastatin (LIPITOR) tablet 10 mg  10 mg Oral QHS    metoprolol succinate (TOPROL-XL) XL tablet 25 mg  25 mg Oral DAILY        Review of Systems  Review of Systems   Constitutional: Negative. Respiratory: Negative. Cardiovascular: Negative. Gastrointestinal: Negative. Genitourinary:        + ostomy bag   All other systems reviewed and are negative. Objective:     Visit Vitals  /60   Pulse 64   Temp 98.8 °F (37.1 °C)   Resp 16   Ht 5' 1\" (1.549 m)   Wt 85.3 kg (188 lb)   SpO2 96%   BMI 35.52 kg/m²      O2 Device: None (Room air)    Temp (24hrs), Av.4 °F (36.9 °C), Min:98 °F (36.7 °C), Max:98.8 °F (37.1 °C)      No intake/output data recorded.  1901 -  0700  In: 1000 [P.O.:800;  I.V.:200]  Out: 275 [Urine:275]    Recent Results (from the past 24 hour(s))   GLUCOSE, POC    Collection Time: 02/15/22  8:25 AM   Result Value Ref Range    Glucose (POC) 103 65 - 117 mg/dL    Performed by Christi Styels odin., POC    Collection Time: 02/15/22 10:52 AM   Result Value Ref Range    Glucose (POC) 106 65 - 117 mg/dL    Performed by Dariela Martinez    GLUCOSE, POC    Collection Time: 02/15/22  3:43 PM   Result Value Ref Range    Glucose (POC) 126 (H) 65 - 117 mg/dL    Performed by Elie Wallis    GLUCOSE, POC    Collection Time: 02/15/22  9:27 PM   Result Value Ref Range    Glucose (POC) 106 65 - 117 mg/dL    Performed by Diandra Waterman    CBC W/O DIFF    Collection Time: 02/16/22  6:37 AM   Result Value Ref Range    WBC 7.4 3.6 - 11.0 K/uL    RBC 3.86 3.80 - 5.20 M/uL    HGB 10.9 (L) 11.5 - 16.0 g/dL    HCT 34.1 (L) 35.0 - 47.0 %    MCV 88.3 80.0 - 99.0 FL    MCH 28.2 26.0 - 34.0 PG    MCHC 32.0 30.0 - 36.5 g/dL    RDW 12.4 11.5 - 14.5 %    PLATELET 357 377 - 688 K/uL    MPV 10.3 8.9 - 12.9 FL    NRBC 0.0 0.0  WBC    ABSOLUTE NRBC 0.00 0.00 - 0.01 K/uL   GLUCOSE, POC    Collection Time: 02/16/22  7:32 AM   Result Value Ref Range    Glucose (POC) 112 65 - 117 mg/dL    Performed by Kalyani Kee (PCT)         CT ABD PELV W WO CONT   Final Result   Similar prominence left renal collecting system. Lower moiety hydronephrosis right kidney advanced compared to prior imaging. No definite calcified stone. Ileal pouch with small quantity nondependent air. Consider collecting system infectious/inflammatory process. Exact cause for hematuria undetermined. XR CHEST PORT   Final Result   No acute process, given limitations of portable technique. PHYSICAL EXAM:    Physical Exam  Vitals and nursing note reviewed. HENT:      Head: Normocephalic and atraumatic. Cardiovascular:      Rate and Rhythm: Normal rate and regular rhythm. Pulmonary:      Effort: No respiratory distress. Breath sounds:  No wheezing. Abdominal:      General: Bowel sounds are normal. There is no distension. Palpations: Abdomen is soft. Tenderness: There is no abdominal tenderness. Comments: Ostomy present with clear yellow fluid   Genitourinary:     Comments: urostomy bag present  Musculoskeletal:      Right lower leg: No edema. Left lower leg: No edema. Skin:     General: Skin is warm. Capillary Refill: Capillary refill takes less than 2 seconds. Neurological:      Mental Status: She is alert and oriented to person, place, and time.    Psychiatric:         Mood and Affect: Mood normal.        Data Review    Recent Results (from the past 24 hour(s))   GLUCOSE, POC    Collection Time: 02/15/22  8:25 AM   Result Value Ref Range    Glucose (POC) 103 65 - 117 mg/dL    Performed by Christi Styles [facility] POC    Collection Time: 02/15/22 10:52 AM   Result Value Ref Range    Glucose (POC) 106 65 - 117 mg/dL    Performed by Verona Rivero    GLUCOSE, POC    Collection Time: 02/15/22  3:43 PM   Result Value Ref Range    Glucose (POC) 126 (H) 65 - 117 mg/dL    Performed by [facility]    GLUCOSE, POC    Collection Time: 02/15/22  9:27 PM   Result Value Ref Range    Glucose (POC) 106 65 - 117 mg/dL    Performed by Rob De Anda    CBC W/O DIFF    Collection Time: 02/16/22  6:37 AM   Result Value Ref Range    WBC 7.4 3.6 - 11.0 K/uL    RBC 3.86 3.80 - 5.20 M/uL    HGB 10.9 (L) 11.5 - 16.0 g/dL    HCT 34.1 (L) 35.0 - 47.0 %    MCV 88.3 80.0 - 99.0 FL    MCH 28.2 26.0 - 34.0 PG    MCHC 32.0 30.0 - 36.5 g/dL    RDW 12.4 11.5 - 14.5 %    PLATELET 176 196 - 968 K/uL    MPV 10.3 8.9 - 12.9 FL    NRBC 0.0 0.0  WBC    ABSOLUTE NRBC 0.00 0.00 - 0.01 K/uL   GLUCOSE, POC    Collection Time: 02/16/22  7:32 AM   Result Value Ref Range    Glucose (POC) 112 65 - 117 mg/dL    Performed by Leroy Yoder (PCT)         Assessment/Plan:     Active Problems:    Sepsis ([facility] [address] (2/13/2022)        Hospital Course:    Sherrie Gibson is a 61year old female with a PMH significant for CAD s/p stent, moyamoya disease, bladder cancer s/p cystectomy with urostomy, diabetes, hypertension, and CVA who presented with diarrhea and vomiting. Initial labs significant for creatinine of 1.07, lactic of 2.4, and positive UA for UTI. Patient admitted for further management. Patient started on Zosyn, vancomycin, and fluid resuscitation. Urine culture growing GNR. Blood culture no growth to date, prelim. ID consulted. Surgery showed no acute cardiopulmonary abnormality. CT of the abdomen showed hydronephrosis of right kidney, ileal pouch a small quantity nondependent air. Plavix was held due to hematuria. Niacin was discontinued. Plavix was restarted due to limited hematuria in UA. Sepsis  Secondary to UTI  Continue Zosyn  2/13 blood: NGTD, prelim  2/13 urine: GNR, prelim  ID following     Hypokalemia - 3.2 will replenish    NAPOLEON - resolved    Diarrhea  Due to antibiotics  Continue probiotics    Hypertension  Continue on enalapril and metoprolol    History of bladder cancer with urostomy in 2010  No complaints    Anemia  UA shows RBC of 0-5  Hemoglobin at 10.9    CAD  Continue on plavix and atorvastatin    Diabetes Mellitus, type II  Continue insulin 7 units at night  Sliding scale insulin    DVT Prophylaxis: SCDs  GI Prophylaxis: tolerating po diet  Discharge and disposition barriers: culture results, 24 - 48 hours    Heidi Marina (Sister) [phone] - updated on patient condition    Care Plan discussed with: Patient/Family, Nurse and     Total time spent with patient: 35 minutes.

## 2022-02-16 NOTE — DISCHARGE SUMMARY
POA/caretaker Henri mSith is bringing [facility] home against medical advice. The medical risks/benefits were explained to her by a member of the medical staff and the patient voiced understanding of those risks. The POAHenri is alert and oriented and is capable of making medical decisions. The POMICHAEL Iraheta signed the [place] form.

## 2022-02-17 NOTE — DISCHARGE SUMMARY
Physician Progress Note      Kwadwo Atwood  CSN #:                  [id number]  :                       1965  ADMIT DATE:       2022 2:50 PM  [address] DATE:        2022 2:58 PM  RESPONDING  PROVIDER #:        Narendra ROWE          QUERY TEXT:    Dear Risa Paz -    Pt admitted with UTI. Pt noted to have urostomy. If possible, please document in the progress notes and discharge summary if you are evaluating and/or treating any of the following: The medical record reflects the following:  Risk Factors: 61 F presented with blood in urostomy bag, vomiting; admitted with sepsis, UTI, NAPOLEON  Clinical Indicators: history of  bladder cancer status post cystectomy currently with urostomy; urine c/s + > 100k E. Coli  Treatment: labs, IV fluids, IV ABT, ID consult    Thank you,  Ilan Carrasco, DUARTEN, RN, CRCR  Clinical   [email or web address] or contact via [employer]  Options provided:  -- UTI due to urostomy  -- UTI is not due to urostomy  -- Other - I will add my own diagnosis  -- Disagree - Not applicable / Not valid  -- Disagree - Clinically unable to determine / Unknown  -- Refer to Clinical Documentation Reviewer    PROVIDER RESPONSE TEXT:    UTI is due to the urostomy.     Query created by: Georgia Aguilar on 2022 6:19 AM      Electronically signed by:  Narendra ROWE 2022 12:15 PM

## 2022-02-20 LAB
BACTERIA SPEC CULT: NORMAL
SPECIAL REQUESTS,SREQ: NORMAL

## 2022-02-22 NOTE — ADT AUTH CERT NOTES
02/16 DISCHARGE by Brothers Yudy       Review Status Review Entered   In Primary 2/22/2022 10:36      Criteria Review   02/16    98.2 °F (36.8 °C) 77 119/66       16 98 %        98.8 °F (37.1 °C) 64 118/60       16 96 %      Results for Isabelle Butler (MRN [id number]) as of 2/22/2022 10:16    2/16/2022 11:48   GLUCOSE,FAST -  (H)      internal med note - Active Problems:    Sepsis ([address] (2/13/2022)           Hospital Course:     Dereck Anton is a 61year old female with a PMH significant for CAD s/p stent, moyamoya disease, bladder cancer s/p cystectomy with urostomy, diabetes, hypertension, and CVA who presented with diarrhea and vomiting. Initial labs significant for creatinine of 1.07, lactic of 2.4, and positive UA for UTI. Patient admitted for further management. Patient started on Zosyn, vancomycin, and fluid resuscitation. Urine culture growing GNR. Blood culture no growth to date, prelim. ID consulted. Surgery showed no acute cardiopulmonary abnormality. CT of the abdomen showed hydronephrosis of right kidney, ileal pouch a small quantity nondependent air. Plavix was held due to hematuria. Niacin was discontinued.  Plavix was restarted due to limited hematuria in UA.     Sepsis  Secondary to UTI  Continue Zosyn  2/13 blood: NGTD, prelim  2/13 urine: GNR, prelim  ID following      Hypokalemia - 3.2 will replenish     NAPOLEON - resolved     Diarrhea  Due to antibiotics  Continue probiotics     Hypertension  Continue on enalapril and metoprolol     History of bladder cancer with urostomy in 2010  No complaints     Anemia  UA shows RBC of 0-5  Hemoglobin at 10.9     CAD  Continue on plavix and atorvastatin     Diabetes Mellitus, type II  Continue insulin 7 units at night  Sliding scale insulin     DVT Prophylaxis: SCDs  GI Prophylaxis: tolerating po diet        Sepsis and Other Febrile Illness, without Focal Infection - Care Day 3 (2/15/2022) by Brothersangeli Jimenes       Review Status Review Entered   Completed 2/22/2022 10:32      Criteria Review      Care Day: 3 Care Date: 2/15/2022 Level of Care: Telemetry    Guideline Day 3    Clinical Status    (X) * Mental status at baseline    2/22/2022 10:32:26 EST by Mandi Freeman      AAO x 4    (X) * Afebrile or fever improved    2/22/2022 10:32:26 EST by Mandi Freeman      temp 98    Activity    (X) Activity as tolerated    2/22/2022 10:32:37 EST by Mandi Freeman      aat    Routes    (X) * Oral hydration    (X) Diet as tolerated    2/22/2022 10:32:26 EST by [facility] adult regular diet    (X) Parenteral or oral medications    2/22/2022 10:32:26 EST by Mandi Freeman      Lipitor 10 mg po hs, plavix 75 mg po daily, Vasotec 2.5 mg po daily, lantus 7 u sc hs, metoprolol 25 mg po daily, pipp jasmin 3.375 g iv q8h    Medications    (X) Possible antimicrobial treatment    2/22/2022 10:32:26 EST by Mandi Freeman      pipp jasmin 3.375 g iv q8h    * Milestone   Additional Notes   02/15   98.7 °F (37.1 °C) 79 131/59 Abnormal 16 96 %      98 °F (36.7 °C) 71 150/66 Abnormal 16 98 %         2/15/2022 07:32   HGB 11.0 (L)   HCT 34.0 (L)      2/15/2022 07:32   Glucose 107 (H)      2/15/2022 15:43   GLUCOSE,FAST -  (H)      Lipitor 10 mg po hs, plavix 75 mg po daily, Vasotec 2.5 mg po daily, lantus 7 u sc hs, metoprolol 25 mg po daily, pipp jasmin 3.375 g iv q8h   hospitalist - 61 y.o. female CAD s/p stent, moyamoya disease, bladder cancer s/p cystectomy with urostomy, diabetes, hypertension, and CVA.  She presented to the ED  for 1 day history of diarrhea, and vomiting.  She also reports associated fevers and noticing bloody urine in her urostomy bag.  Denies abdominal pain, or pain around the ostomy site.  She otherwise denies cough, shortness of breath, or sore throat.     Pt started on IV antibiotics for abnormal UA, Infectious disease consulted for management.        Genitourinary:      Comments: Right urostomy,ileo-conduit stoma pink, appliance intact, clear yellow urine   CT ABD PELV W WO CONT   Final Result   Similar prominence left renal collecting system. Lower moiety hydronephrosis right kidney advanced compared to prior imaging. No definite calcified stone. Ileal pouch with small quantity nondependent air. Consider collecting system infectious/inflammatory process.       Exact cause for hematuria undetermined.               XR CHEST PORT   Final Result   No acute process, given limitations of portable technique. Assessment/Plan: 1. Sepsis- secondary to UTI, ID following, on IV zosyn, URC pending final results, GNR   BC no growth so far. LA at 3.0.        2. Hypokalemia- 40 meq KCL x 1 dose today.       3. NAPOLEON- resolved, creatinine normal.       4. Diarrhea- ? R/to abx, continue probiotics       5. Discharge plan- home when clinically stable.                        DVT Prophylaxis: sequential compression      id note -    Assessment/Plan       1. Sepsis on admission from  source, E.coli isolated from urine Cx, blood Cx neg        Afebrile w a normal WBC on routine labs        Continue on Zosyn on pending ID GNR        Routine labs in the morning        2. UTI, h/o cystectomy w urostomy for bladder CA in 2010, denies h/o recurrent UTI        Mild right hydronephrosis and evidence of collecting system infection on CT        GNR isolated from urine Cx, continue on Zosyn pending final Cx        3.  Diarrhea on presentation, reason unclear, resolving, low suspicion for C.diff        Continue probiotics while on antibiotics        4. Acute renal failure, resolved, Cr at baseline        5. H/o CVA, and Moyamoya disease per records

## 2022-03-19 PROBLEM — A41.9 SEPSIS (HCC): Status: ACTIVE | Noted: 2022-02-13

## 2022-06-14 ENCOUNTER — TRANSCRIBE ORDER (OUTPATIENT)
Dept: SCHEDULING | Age: 57
End: 2022-06-14

## 2022-06-14 DIAGNOSIS — Z87.891 PERSONAL HISTORY OF TOBACCO USE, PRESENTING HAZARDS TO HEALTH: Primary | ICD-10-CM

## 2022-08-31 ENCOUNTER — TRANSCRIBE ORDER (OUTPATIENT)
Dept: SCHEDULING | Age: 57
End: 2022-08-31

## 2022-08-31 DIAGNOSIS — Z12.31 SCREENING MAMMOGRAM FOR HIGH-RISK PATIENT: Primary | ICD-10-CM

## 2022-09-16 ENCOUNTER — HOSPITAL ENCOUNTER (OUTPATIENT)
Dept: MAMMOGRAPHY | Age: 57
Discharge: HOME | End: 2022-09-16
Payer: MEDICARE

## 2022-09-16 ENCOUNTER — TRANSCRIBE ORDER (OUTPATIENT)
Dept: REGISTRATION | Age: 57
End: 2022-09-16

## 2022-09-16 DIAGNOSIS — Z12.31 VISIT FOR SCREENING MAMMOGRAM: Primary | ICD-10-CM

## 2022-09-16 DIAGNOSIS — Z12.31 VISIT FOR SCREENING MAMMOGRAM: ICD-10-CM

## 2022-09-16 PROCEDURE — 77063 BREAST TOMOSYNTHESIS BI: CPT

## 2022-12-23 ENCOUNTER — APPOINTMENT (OUTPATIENT)
Dept: CT IMAGING | Age: 57
End: 2022-12-23
Attending: STUDENT IN AN ORGANIZED HEALTH CARE EDUCATION/TRAINING PROGRAM
Payer: MEDICARE

## 2022-12-23 ENCOUNTER — HOSPITAL ENCOUNTER (INPATIENT)
Age: 57
LOS: 1 days | Discharge: LEFT BEFORE DISCHARGE | End: 2022-12-23
Attending: STUDENT IN AN ORGANIZED HEALTH CARE EDUCATION/TRAINING PROGRAM | Admitting: INTERNAL MEDICINE
Payer: MEDICARE

## 2022-12-23 VITALS
RESPIRATION RATE: 14 BRPM | SYSTOLIC BLOOD PRESSURE: 151 MMHG | DIASTOLIC BLOOD PRESSURE: 58 MMHG | BODY MASS INDEX: 20.39 KG/M2 | HEART RATE: 60 BPM | OXYGEN SATURATION: 100 % | WEIGHT: 108 LBS | HEIGHT: 61 IN | TEMPERATURE: 98.1 F

## 2022-12-23 DIAGNOSIS — I65.23 BILATERAL CAROTID ARTERY STENOSIS: ICD-10-CM

## 2022-12-23 DIAGNOSIS — N30.00 ACUTE CYSTITIS WITHOUT HEMATURIA: ICD-10-CM

## 2022-12-23 DIAGNOSIS — G45.9 TIA (TRANSIENT ISCHEMIC ATTACK): Primary | ICD-10-CM

## 2022-12-23 LAB
ABO + RH BLD: NORMAL
ALBUMIN SERPL-MCNC: 4 G/DL (ref 3.5–5)
ALBUMIN/GLOB SERPL: 1.3 {RATIO} (ref 1.1–2.2)
ALP SERPL-CCNC: 61 U/L (ref 45–117)
ALT SERPL-CCNC: 24 U/L (ref 12–78)
ANION GAP SERPL CALC-SCNC: 10 MMOL/L (ref 5–15)
APPEARANCE UR: ABNORMAL
APTT PPP: 32.2 SEC (ref 21.2–34.1)
AST SERPL W P-5'-P-CCNC: 15 U/L (ref 15–37)
BACTERIA URNS QL MICRO: NEGATIVE /HPF
BASOPHILS # BLD: 0.1 K/UL (ref 0–0.1)
BASOPHILS NFR BLD: 1 % (ref 0–1)
BILIRUB SERPL-MCNC: 0.4 MG/DL (ref 0.2–1)
BILIRUB UR QL: NEGATIVE
BLOOD GROUP ANTIBODIES SERPL: NEGATIVE
BNP SERPL-MCNC: 263 PG/ML
BUN SERPL-MCNC: 14 MG/DL (ref 6–20)
BUN/CREAT SERPL: 16 (ref 12–20)
CA-I BLD-MCNC: 9.8 MG/DL (ref 8.5–10.1)
CHLORIDE SERPL-SCNC: 100 MMOL/L (ref 97–108)
CO2 SERPL-SCNC: 23 MMOL/L (ref 21–32)
COLOR UR: ABNORMAL
COVID-19 RAPID TEST, COVR: NOT DETECTED
CREAT SERPL-MCNC: 0.86 MG/DL (ref 0.55–1.02)
DIFFERENTIAL METHOD BLD: NORMAL
EOSINOPHIL # BLD: 0.1 K/UL (ref 0–0.4)
EOSINOPHIL NFR BLD: 1 % (ref 0–7)
ERYTHROCYTE [DISTWIDTH] IN BLOOD BY AUTOMATED COUNT: 12 % (ref 11.5–14.5)
FLUAV AG NPH QL IA: NEGATIVE
FLUBV AG NOSE QL IA: NEGATIVE
GLOBULIN SER CALC-MCNC: 3.1 G/DL (ref 2–4)
GLUCOSE SERPL-MCNC: 97 MG/DL (ref 65–100)
GLUCOSE UR STRIP.AUTO-MCNC: NEGATIVE MG/DL
HCT VFR BLD AUTO: 41 % (ref 35–47)
HGB BLD-MCNC: 13.4 G/DL (ref 11.5–16)
HGB UR QL STRIP: NEGATIVE
IMM GRANULOCYTES # BLD AUTO: 0 K/UL (ref 0–0.04)
IMM GRANULOCYTES NFR BLD AUTO: 0 % (ref 0–0.5)
INR PPP: 1 (ref 0.9–1.1)
KETONES UR QL STRIP.AUTO: NEGATIVE MG/DL
LEUKOCYTE ESTERASE UR QL STRIP.AUTO: ABNORMAL
LYMPHOCYTES # BLD: 1.4 K/UL (ref 0.8–3.5)
LYMPHOCYTES NFR BLD: 20 % (ref 12–49)
MCH RBC QN AUTO: 28.2 PG (ref 26–34)
MCHC RBC AUTO-ENTMCNC: 32.7 G/DL (ref 30–36.5)
MCV RBC AUTO: 86.1 FL (ref 80–99)
MONOCYTES # BLD: 0.8 K/UL (ref 0–1)
MONOCYTES NFR BLD: 11 % (ref 5–13)
NEUTS SEG # BLD: 4.6 K/UL (ref 1.8–8)
NEUTS SEG NFR BLD: 67 % (ref 32–75)
NITRITE UR QL STRIP.AUTO: POSITIVE
NRBC # BLD: 0 K/UL (ref 0–0.01)
NRBC BLD-RTO: 0 PER 100 WBC
PH UR STRIP: 7 [PH]
PLATELET # BLD AUTO: 338 K/UL (ref 150–400)
PMV BLD AUTO: 9.4 FL (ref 8.9–12.9)
POTASSIUM SERPL-SCNC: 4.4 MMOL/L (ref 3.5–5.1)
PROT SERPL-MCNC: 7.1 G/DL (ref 6.4–8.2)
PROT UR STRIP-MCNC: NEGATIVE MG/DL
PROTHROMBIN TIME: 13.4 SEC (ref 11.9–14.6)
RBC # BLD AUTO: 4.76 M/UL (ref 3.8–5.2)
RBC #/AREA URNS HPF: ABNORMAL /HPF (ref 0–5)
SODIUM SERPL-SCNC: 133 MMOL/L (ref 136–145)
SP GR UR REFRACTOMETRY: 1.01 (ref 1–1.03)
SPECIMEN EXP DATE BLD: NORMAL
THERAPEUTIC RANGE,PTTT: NORMAL SEC (ref 82–109)
TROPONIN-HIGH SENSITIVITY: 11 NG/L (ref 0–51)
UA: UC IF INDICATED,UAUC: ABNORMAL
UROBILINOGEN UR QL STRIP.AUTO: 0.1 EU/DL (ref 0.2–1)
WBC # BLD AUTO: 7 K/UL (ref 3.6–11)
WBC URNS QL MICRO: ABNORMAL /HPF (ref 0–4)

## 2022-12-23 PROCEDURE — 86900 BLOOD TYPING SEROLOGIC ABO: CPT

## 2022-12-23 PROCEDURE — 84484 ASSAY OF TROPONIN QUANT: CPT

## 2022-12-23 PROCEDURE — 87086 URINE CULTURE/COLONY COUNT: CPT

## 2022-12-23 PROCEDURE — 65270000029 HC RM PRIVATE

## 2022-12-23 PROCEDURE — 74011000636 HC RX REV CODE- 636: Performed by: STUDENT IN AN ORGANIZED HEALTH CARE EDUCATION/TRAINING PROGRAM

## 2022-12-23 PROCEDURE — 85610 PROTHROMBIN TIME: CPT

## 2022-12-23 PROCEDURE — 74011000250 HC RX REV CODE- 250: Performed by: STUDENT IN AN ORGANIZED HEALTH CARE EDUCATION/TRAINING PROGRAM

## 2022-12-23 PROCEDURE — 36415 COLL VENOUS BLD VENIPUNCTURE: CPT

## 2022-12-23 PROCEDURE — 87186 SC STD MICRODIL/AGAR DIL: CPT

## 2022-12-23 PROCEDURE — 70450 CT HEAD/BRAIN W/O DYE: CPT

## 2022-12-23 PROCEDURE — 85730 THROMBOPLASTIN TIME PARTIAL: CPT

## 2022-12-23 PROCEDURE — 85025 COMPLETE CBC W/AUTO DIFF WBC: CPT

## 2022-12-23 PROCEDURE — 74011250637 HC RX REV CODE- 250/637: Performed by: STUDENT IN AN ORGANIZED HEALTH CARE EDUCATION/TRAINING PROGRAM

## 2022-12-23 PROCEDURE — 99285 EMERGENCY DEPT VISIT HI MDM: CPT

## 2022-12-23 PROCEDURE — 87635 SARS-COV-2 COVID-19 AMP PRB: CPT

## 2022-12-23 PROCEDURE — 83880 ASSAY OF NATRIURETIC PEPTIDE: CPT

## 2022-12-23 PROCEDURE — 80053 COMPREHEN METABOLIC PANEL: CPT

## 2022-12-23 PROCEDURE — 70496 CT ANGIOGRAPHY HEAD: CPT

## 2022-12-23 PROCEDURE — 74011250636 HC RX REV CODE- 250/636: Performed by: STUDENT IN AN ORGANIZED HEALTH CARE EDUCATION/TRAINING PROGRAM

## 2022-12-23 PROCEDURE — 92610 EVALUATE SWALLOWING FUNCTION: CPT

## 2022-12-23 PROCEDURE — 87077 CULTURE AEROBIC IDENTIFY: CPT

## 2022-12-23 PROCEDURE — 81001 URINALYSIS AUTO W/SCOPE: CPT

## 2022-12-23 PROCEDURE — 87804 INFLUENZA ASSAY W/OPTIC: CPT

## 2022-12-23 PROCEDURE — 93005 ELECTROCARDIOGRAM TRACING: CPT

## 2022-12-23 RX ORDER — ENALAPRIL MALEATE 2.5 MG/1
2.5 TABLET ORAL DAILY
Status: DISCONTINUED | OUTPATIENT
Start: 2022-12-24 | End: 2022-12-23 | Stop reason: HOSPADM

## 2022-12-23 RX ORDER — ACETAMINOPHEN 500 MG
1000 TABLET ORAL
Status: COMPLETED | OUTPATIENT
Start: 2022-12-23 | End: 2022-12-23

## 2022-12-23 RX ORDER — INSULIN LISPRO 100 [IU]/ML
INJECTION, SOLUTION INTRAVENOUS; SUBCUTANEOUS
Status: DISCONTINUED | OUTPATIENT
Start: 2022-12-23 | End: 2022-12-23 | Stop reason: HOSPADM

## 2022-12-23 RX ORDER — CLOPIDOGREL BISULFATE 75 MG/1
75 TABLET ORAL DAILY
Status: DISCONTINUED | OUTPATIENT
Start: 2022-12-24 | End: 2022-12-23 | Stop reason: HOSPADM

## 2022-12-23 RX ORDER — METOPROLOL SUCCINATE 25 MG/1
25 TABLET, EXTENDED RELEASE ORAL DAILY
Status: DISCONTINUED | OUTPATIENT
Start: 2022-12-24 | End: 2022-12-23 | Stop reason: HOSPADM

## 2022-12-23 RX ORDER — ACETAMINOPHEN 325 MG/1
650 TABLET ORAL
Status: DISCONTINUED | OUTPATIENT
Start: 2022-12-23 | End: 2022-12-23 | Stop reason: HOSPADM

## 2022-12-23 RX ORDER — MAGNESIUM SULFATE 100 %
4 CRYSTALS MISCELLANEOUS AS NEEDED
Status: DISCONTINUED | OUTPATIENT
Start: 2022-12-23 | End: 2022-12-23 | Stop reason: HOSPADM

## 2022-12-23 RX ORDER — CEFPODOXIME PROXETIL 200 MG/1
200 TABLET, FILM COATED ORAL 2 TIMES DAILY
Qty: 14 TABLET | Refills: 0 | Status: SHIPPED | OUTPATIENT
Start: 2022-12-23 | End: 2022-12-30

## 2022-12-23 RX ORDER — POLYETHYLENE GLYCOL 3350 17 G/17G
17 POWDER, FOR SOLUTION ORAL
Status: DISCONTINUED | OUTPATIENT
Start: 2022-12-23 | End: 2022-12-23 | Stop reason: HOSPADM

## 2022-12-23 RX ORDER — ATORVASTATIN CALCIUM 20 MG/1
20 TABLET, FILM COATED ORAL
Status: DISCONTINUED | OUTPATIENT
Start: 2022-12-23 | End: 2022-12-23 | Stop reason: HOSPADM

## 2022-12-23 RX ORDER — METOCLOPRAMIDE HYDROCHLORIDE 5 MG/ML
10 INJECTION INTRAMUSCULAR; INTRAVENOUS
Status: DISCONTINUED | OUTPATIENT
Start: 2022-12-23 | End: 2022-12-23 | Stop reason: HOSPADM

## 2022-12-23 RX ORDER — CIPROFLOXACIN 2 MG/ML
400 INJECTION, SOLUTION INTRAVENOUS EVERY 12 HOURS
Status: DISCONTINUED | OUTPATIENT
Start: 2022-12-23 | End: 2022-12-23

## 2022-12-23 RX ORDER — DEXTROSE MONOHYDRATE 100 MG/ML
0-250 INJECTION, SOLUTION INTRAVENOUS AS NEEDED
Status: DISCONTINUED | OUTPATIENT
Start: 2022-12-23 | End: 2022-12-23 | Stop reason: HOSPADM

## 2022-12-23 RX ORDER — ONDANSETRON 2 MG/ML
4 INJECTION INTRAMUSCULAR; INTRAVENOUS
Status: DISCONTINUED | OUTPATIENT
Start: 2022-12-23 | End: 2022-12-23 | Stop reason: HOSPADM

## 2022-12-23 RX ADMIN — CEFTRIAXONE SODIUM 1 G: 1 INJECTION, POWDER, FOR SOLUTION INTRAMUSCULAR; INTRAVENOUS at 16:57

## 2022-12-23 RX ADMIN — IOPAMIDOL 100 ML: 755 INJECTION, SOLUTION INTRAVENOUS at 14:19

## 2022-12-23 RX ADMIN — ACETAMINOPHEN 1000 MG: 500 TABLET ORAL at 16:57

## 2022-12-23 NOTE — ED TRIAGE NOTES
Pts family reports sudden onset of worsening confusion, unable to answer questions, slurred speech, seems to have resolved except for headache.  Dr Maggie Sacks stated to call level 2 stroke alert

## 2022-12-23 NOTE — ED NOTES
Pt ambulatory to restroom with caregiver, gait steady and no abnormality but caregiver states she's slower than normal

## 2022-12-23 NOTE — ED PROVIDER NOTES
[facility]  EMERGENCY DEPARTMENT ENCOUNTER NOTE    Date: 12/23/2022  Patient Name: Anastacio Logan    History of Presenting Illness     Chief Complaint   Patient presents with    Stroke     HPI: Anastacio Logan, 61 y.o. female with  a hx of moyamoya syndrome and CVA last stroke was 10 years ago  presents for transient aphasia. The patient has a history of moyamoya syndrome and multiple stroke last of which was of more than 10 years ago, currently on Plavix, presents with transient confusion. She was unable to articulate any sentences around 30 minutes prior to presentation. Her last known normal was earlier in the morning. She reported a headache. No weakness or numbness in the upper or lower extremities. No chest pain or shortness of breath. No fevers chills. No runny nose or sore throat. Prior to her presentation, she did have significant confusion per the sister that have now improved. Medical History   I reviewed the medical, surgical, family, and social history, as well as allergies:    PCP: Flako Galvez MD    Past Medical History:  No past medical history on file. Past Surgical History:  No past surgical history on file. Current Outpatient Medications:  Current Outpatient Medications   Medication Instructions    clopidogreL (PLAVIX) 75 mg, Oral, DAILY    enalapril (VASOTEC) 2.5 mg, Oral, DAILY    lovastatin (MEVACOR) 40 mg, Oral, DAILY    metFORMIN (GLUCOPHAGE) 1,000 mg, Oral, 2 TIMES DAILY    metoprolol succinate (TOPROL-XL) 25 mg, Oral, DAILY    SITagliptin phosphate (JANUVIA) 100 mg, Oral, DAILY      Family History:  No family history on file. Social History: Allergies:  No Known Allergies    Review of Systems     Review of Systems  Negative: Positives and pertinent negatives as per HPI. All other systems were reviewed and are negative. Physical Exam & Vital Signs   Vital Signs - I reviewed the patient's vital signs.     Patient Vitals for the past 12 hrs:   Temp Pulse Resp BP SpO2   12/23/22 1315 98.1 °F (36.7 °C) 72 18 139/66 99 %     Physical Exam:    GENERAL: awake, alert, cooperative, not in distress  HEENT:  * Pupils equal, EOMI  * Head atraumatic  CV:  * audible heart sounds  * warm and perfused extremities bilaterally  PULMONARY: Good air movement, no wheezes, no crackles  ABDOMEN/: soft, no distension, no guarding, no abdominal tenderness  EXTREMITIES/BACK: warm and perfused, no tenderness, no edema  SKIN: no rashes or signs of trauma  NEURO:   * GCS = 15 (E=4, V=5, M=6)  * Awake, alert, oriented x 3, normal speech  * CNs II-XII intact  * Strength 5/5 in all extremities  * Sensory exam grossly normal  * No pronator drift or dysmetria  * NIHSS 0      Medical Decision Making     Patient is a 61 y.o. female presenting for transient aphasia. Vitals reveal no significant abnormalities and physical exam reveals no significant abnormalities. EKG showed no significant abnormalities. Based on the history, physical exam, risk factors, and vital signs, differential includes: TIA, seizure, ICH, CVA, metabolic encephalopathy, flu, COVID, UTI, pneumonia. See ED Course and Reassessment for evaluation and discussion. EMR Automatically Imported Results     Labs:  Recent Results (from the past 12 hour(s))   CBC WITH AUTOMATED DIFF    Collection Time: 12/23/22  1:42 PM   Result Value Ref Range    WBC 7.0 3.6 - 11.0 K/uL    RBC 4.76 3.80 - 5.20 M/uL    HGB 13.4 11.5 - 16.0 g/dL    HCT 41.0 35.0 - 47.0 %    MCV 86.1 80.0 - 99.0 FL    MCH 28.2 26.0 - 34.0 PG    MCHC 32.7 30.0 - 36.5 g/dL    RDW 12.0 11.5 - 14.5 %    PLATELET 890 102 - 038 K/uL    MPV 9.4 8.9 - 12.9 FL    NRBC 0.0 0.0  WBC    ABSOLUTE NRBC 0.00 0.00 - 0.01 K/uL    NEUTROPHILS 67 32 - 75 %    LYMPHOCYTES 20 12 - 49 %    MONOCYTES 11 5 - 13 %    EOSINOPHILS 1 0 - 7 %    BASOPHILS 1 0 - 1 %    IMMATURE GRANULOCYTES 0 0 - 0.5 %    ABS. NEUTROPHILS 4.6 1.8 - 8.0 K/UL    ABS.  LYMPHOCYTES 1.4 0.8 - 3.5 K/UL    ABS. MONOCYTES 0.8 0.0 - 1.0 K/UL    ABS. EOSINOPHILS 0.1 0.0 - 0.4 K/UL    ABS. BASOPHILS 0.1 0.0 - 0.1 K/UL    ABS. IMM. GRANS. 0.0 0.00 - 0.04 K/UL    DF AUTOMATED     METABOLIC PANEL, COMPREHENSIVE    Collection Time: 12/23/22  1:42 PM   Result Value Ref Range    Sodium 133 (L) 136 - 145 mmol/L    Potassium 4.4 3.5 - 5.1 mmol/L    Chloride 100 97 - 108 mmol/L    CO2 23 21 - 32 mmol/L    Anion gap 10 5 - 15 mmol/L    Glucose 97 65 - 100 mg/dL    BUN 14 6 - 20 mg/dL    Creatinine 0.86 0.55 - 1.02 mg/dL    BUN/Creatinine ratio 16 12 - 20      eGFR >60 >60 ml/min/1.73m2    Calcium 9.8 8.5 - 10.1 mg/dL    Bilirubin, total 0.4 0.2 - 1.0 mg/dL    AST (SGOT) 15 15 - 37 U/L    ALT (SGPT) 24 12 - 78 U/L    Alk.  phosphatase 61 45 - 117 U/L    Protein, total 7.1 6.4 - 8.2 g/dL    Albumin 4.0 3.5 - 5.0 g/dL    Globulin 3.1 2.0 - 4.0 g/dL    A-G Ratio 1.3 1.1 - 2.2     TROPONIN-HIGH SENSITIVITY    Collection Time: 12/23/22  1:42 PM   Result Value Ref Range    Troponin-High Sensitivity 11 0 - 51 ng/L   NT-PRO BNP    Collection Time: 12/23/22  1:42 PM   Result Value Ref Range    NT pro- (H) <125 pg/mL   TYPE & SCREEN    Collection Time: 12/23/22  1:42 PM   Result Value Ref Range    Crossmatch Expiration 12/26/2022,2359     ABO/Rh(D) [place] Positive     Antibody screen Negative    PTT    Collection Time: 12/23/22  1:42 PM   Result Value Ref Range    aPTT 32.2 21.2 - 34.1 sec    aPTT, therapeutic range   82 - 109 sec   PROTHROMBIN TIME + INR    Collection Time: 12/23/22  1:42 PM   Result Value Ref Range    Prothrombin time 13.4 11.9 - 14.6 sec    INR 1.0 0.9 - 1.1     COVID-19 RAPID TEST    Collection Time: 12/23/22  1:42 PM   Result Value Ref Range    COVID-19 rapid test Not Detected Not Detected     INFLUENZA A & B AG (RAPID TEST)    Collection Time: 12/23/22  2:10 PM   Result Value Ref Range    Influenza A Antigen Negative Negative      Influenza B Antigen Negative Negative     URINALYSIS W/ REFLEX CULTURE    Collection Time: 12/23/22  2:32 PM    Specimen: Urine   Result Value Ref Range    Color Yellow/Straw      Appearance Hazy (A) Clear      Specific gravity 1.015 1.003 - 1.030      pH (UA) 7.0      Protein Negative Negative mg/dL    Glucose Negative Negative mg/dL    Ketone Negative Negative mg/dL    Bilirubin Negative Negative      Blood Negative Negative      Urobilinogen 0.1 (L) 0.2 - 1.0 EU/dL    Nitrites Positive (A) Negative      Leukocyte Esterase Large (A) Negative      UA:UC IF INDICATED Urine Culture Ordered (A) Culture not indicated by UA result      WBC 10-20 0 - 4 /hpf    RBC 0-5 0 - 5 /hpf    Bacteria Negative Negative /hpf     Radiologic Studies:  CT Results  (Last 48 hours)                 12/23/22 1409  CTA CODE NEURO HEAD AND NECK W CONT Final result    Impression:      1. Occlusion of the right internal carotid artery at its origin. Greater than   70% stenosis of the left internal carotid artery. 2.  Occlusion of the left M1 segment with collaterals and reconstitution of the   distal MCA branches likely chronic. 3.  Reconstitution of the right internal carotid artery distally. All   intracranial vessels are small in caliber and difficult to evaluate. Narrative:  EXAM: CTA CODE NEURO HEAD AND NECK W CONT       INDICATION: stroke sx's       COMPARISON: None. CONTRAST: 100 mL of Isovue-370. TECHNIQUE:  Unenhanced  images were obtained to localize the volume for   acquisition. Multislice helical axial CT angiography was performed from the   aortic arch to the top of the head during uneventful rapid bolus intravenous   contrast administration. Coronal and sagittal reformations and 3D post   processing was performed. CT dose reduction was achieved through use of a   standardized protocol tailored for this examination and automatic exposure   control for dose modulation. FINDINGS:       CTA NECK   There is conventional three vessel arch anatomy. Possible stenosis at the origin   of the bilateral common carotid arteries. [place] % of right carotid artery stenosis: Greater than 70%   % of left carotid artery stenosis: 100%   Measurements utilized NASCET criteria. NASCET method was utilized for calculating stenosis. The vertebral arteries are codominant and patent. The cervical soft tissues are   unremarkable. There are degenerative changes of the cervical spine. CTA HEAD   The vertebral arteries are codominant. The basilar artery and its branches are   normal. There is reconstitution of the right internal carotid artery in the   petrous portion. The distal right internal carotid artery is severely small in   caliber. Evaluation of the distal vessels is limited by bolus quality as well as   a small caliber vessels. The distal left internal carotid artery is markedly   small in caliber as well. There is occlusion of the left M1 segment with   multiple small collaterals and reconstitution of the distal branches. Occlusion   of the left A1 is also present. . . There is no aneurysm. Craniotomy changes are   noted bilaterally. . Chronic small vessel ischemic disease. 12/23/22 1409  CT CODE NEURO HEAD WO CONTRAST Final result    Impression:  1. Volume loss and white matter changes within the frontal lobes. No acute   intracranial abnormality       Narrative:  INDICATION:  stroke sx's        Exam: Unenhanced head CT. No comparisons. 5 mm axial images were obtained from   the skull base to vertex. Sagittal and coronal reformats were performed. CT dose   reduction was achieved through use of a standardized protocol tailored for this   examination and automatic exposure control for dose modulation. Findings: Ventricles and cisterns are enlarged compatible with the overall   degree of volume loss. Generalized atrophy of the frontal lobes with associated   periventricular white matter changes also localized within the frontal lobes. [place] There are no extra-axial fluid collections mass lesions or mass effect. There   are no extra-axial fluid collections or midline shift. No evidence of acute   intracranial hemorrhage or acute infarct. Postoperative changes to the parietal   bones bilaterally                 CXR Results  (Last 48 hours)      None          Medications ordered:  Medications   cefTRIAXone (ROCEPHIN) 1 g in sterile water (preservative free) 10 mL IV syringe (has no administration in time range)   iopamidoL (ISOVUE-370) 370 mg iodine /mL (76 %) injection 100 mL (100 mL IntraVENous Given 12/23/22 1419)       ED Course & Reassessment     ED Course:     ED Course as of 12/23/22 1512   Fri Dec 23, 2022   1452 CBC does not show any evidence of acute process. Leukocytosis not present to suggest infection. Hemoglobin not suggestive of acute anemia. No significant electrolyte derangements. Creatinine is not elevated more than baseline range making [place] unlikely. No significant transaminitis noted. Normal bilirubin. Trop 11, ACS unlikely given duration of sxs. COVID-19 testing is negative. BNP is not significantly elevated making acute CHF less likely. CTA shows multiple vessel disease however it has constitution, likely chronic given her history of moyamoya. Head CT was negative for acute process making acute intracranial bleed unlikely. No evidence of large subacute stroke, ventriculomegaly, or masses. [SS]   2953 Will admit for TIA. [SS]   1453 Influenza swab negative.   [SS]   1512 UA shows UTI. [SS]      ED Course User Index  [SS] Emma Guillen MD       Reassessment:    Will admit. Final Disposition     Admission: [facility]    After completion of ED workup and discussion of results and diagnoses, patient was admitted to the hospital. Case was discussed with admitting provider.     Procedures, Critical Care, & Clinical Tools   Performed by: Inge Olivas MD  Procedures     EKG interpretation (Preliminary):  Rhythm: normal sinus rhythm; and regular . Rate (approx.): 69.  Axis: normal;  TN interval: normal;  QRS interval: normal ;  ST/T wave: normal;  Other findings: normal.      Diagnosis     Clinical Impression:   1. TIA (transient ischemic attack)    2. Bilateral carotid artery stenosis    3. Acute cystitis without hematuria        Attestations:  Hailey Singleton MD    Documentation Comments   - I am the first and primary provider for this patient and am the primary provider of record. - Initial assessment performed. The patients presenting problems have been discussed, and the staff are in agreement with the care plan formulated and outlined with them. I have encouraged them to ask questions as they arise throughout their visit. - Available medical records, nursing notes, old EKGs, and EMS run sheets (if patient was EMS transported) were reviewed    Please note that this dictation was completed with [employer], the computer voice recognition software. Quite often unanticipated grammatical, syntax, homophones, and other interpretive errors are inadvertently transcribed by the computer software. Please disregard these errors. Please excuse any errors that have escaped final proofreading.

## 2022-12-23 NOTE — ED NOTES
After admission, the patient and her sister decided to leave [address] because the patient has resolution of her symptoms. The patient has chosen to leave the hospital against our medical advice due to not wanting to be admitted. The patient is clinically not intoxicated, appears to have intact insight, judgment and reason and in my medical opinion has the capacity to make decisions. It would be battery to subject a patient to treatment against their will. I have voiced my concerns for the patient's health given that a full evaluation and/or treatment had not occurred. I have discussed the need for continued evaluation to determine if their symptoms are caused by a condition that present risk of death or morbidity. Risks including but not limited to death, permanent disability, prolonged hospitalization, prolonged illness, were discussed. I tried offering alternative options in hopes that the patient might be amenable to partial evaluation and treatment which would be medically beneficial to the patient, though the patient declined my options and insisted on leaving. Because I have been unable to convince the patient to stay, I answered any questions they had about their condition and asked them to return to this or any other ED as soon as possible to complete their evaluation. I emphasized that leaving against medical advice does not preclude returning here for further evaluation. I urged the patient to return if they change their mind about the further evaluation and treatment.

## 2022-12-23 NOTE — Clinical Note
Status[de-identified] INPATIENT [101]   Type of Bed: Medical [8]   Cardiac Monitoring Required?: No   Inpatient Hospitalization Certified Necessary for the Following Reasons: 1.  Patient Failed outpatient treatment (further clarification in H&P documentation)   Admitting Diagnosis: TIA (transient ischemic attack) [473671]   Admitting Physician: Eric LARIOS [facility] [[id number]]   Attending Physician: Kirt Nielson [[id number]]   Estimated Length of Stay: 2 Midnights   Discharge Plan[de-identified] Home with Office Follow-up

## 2022-12-23 NOTE — H&P
History and Physical        Patient: Janet Urena MRN: [id number]  SSN: [id number]    YOB: 1965  Age: 61 y.o. Sex: female      Subjective:      Janet Urena is a 61 y.o. 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 Surgical history significant for complete hysterectomy, cholecystectomy. Ms. Katina Eagle presented to the ED with transient aphasia, and confusion per her care taker. She was unable to articulate any sentenced for approximately 30 minutes prior to admission. She denies weakness or numbness in the upper or lower extremities. She denies chest pain, shortness of breath, no recent fevers, cough, or congestion. No change in medications. Per her sister who is at the bedside her speech and mentation are back to baseline. Patient has equal strength on bilateral upper/lower extremities. She reports a headache today. Patient admitted for further work up for TIA, pyelonephritis. Labs on admission:  CBC unremarkable, Sodium 133, otherwise unremarkable CMP, urinalysis Positive for nitrites, large leukocytes, WBC 10-20, Negative Bacteria, urine culture pending. Started on Rocephin 1 gm in ED. Ct of head shows: IMPRESSION: 1. Volume loss and white matter changes within the frontal lobes. No acute intracranial abnormality  CTA code Neuro Head/neck: IMPRESSION:    1. Occlusion of the right internal carotid artery at its origin. Greater than  70% stenosis of the left internal carotid artery. 2.  Occlusion of the left M1 segment with collaterals and reconstitution of the  distal MCA branches likely chronic. 3.  Reconstitution of the right internal carotid artery distally. All  intracranial vessels are small in caliber and difficult to evaluate. Neurology consulted for further management.  Discussed with patient and family. No past medical history on file. No past surgical history on file. No family history on file. Social History     Tobacco Use    Smoking status: Not on file    Smokeless tobacco: Not on file   Substance Use Topics    Alcohol use: Not on file      Prior to Admission medications    Medication Sig Start Date End Date Taking? Authorizing Provider   SITagliptin (Januvia) 100 mg tablet Take 100 mg by mouth daily. Other, MD Rebecca   clopidogreL (PLAVIX) 75 mg tab Take 75 mg by mouth daily. Provider, Historical   enalapril (VASOTEC) 2.5 mg tablet Take 2.5 mg by mouth daily. Provider, Historical   lovastatin (MEVACOR) 40 mg tablet Take 40 mg by mouth daily. Provider, Historical   metFORMIN (GLUCOPHAGE) 1,000 mg tablet Take 1,000 mg by mouth two (2) times a day. Provider, Historical   metoprolol succinate (TOPROL-XL) 25 mg XL tablet Take 25 mg by mouth daily. Provider, Historical        No Known Allergies    Review of Systems   Constitutional:  Negative for chills, fever and weight loss. HENT:  Positive for hearing loss. Deaf in left ear, no hearing aids. Respiratory:  Negative for cough, sputum production, shortness of breath and wheezing. Cardiovascular:  Negative for chest pain, palpitations and leg swelling. Gastrointestinal: Negative. Genitourinary:  Negative for dysuria, flank pain, frequency, hematuria and urgency. Musculoskeletal: Negative. Skin:  Negative for itching and rash. Neurological:  Positive for headaches. Negative for dizziness, tremors, sensory change, speech change, focal weakness, seizures and loss of consciousness. Psychiatric/Behavioral: Negative.          Objective:     Vitals:    12/23/22 1315 12/23/22 1548 12/23/22 1601   BP: 139/66 (!) 134/58 (!) 152/61   Pulse: 72 60 (!) 59   Resp: 18 14 16   Temp: 98.1 °F (36.7 °C)     SpO2: 99% 99% 100%   Weight: 49 kg (108 lb)     Height: 5' 1\" (1.549 m)          Physical Exam  Constitutional: Appearance: She is ill-appearing. HENT:      Head: Normocephalic and atraumatic. Nose: No congestion or rhinorrhea. Eyes:      Conjunctiva/sclera: Conjunctivae normal.      Pupils: Pupils are equal, round, and reactive to light. Cardiovascular:      Rate and Rhythm: Normal rate and regular rhythm. Heart sounds: No murmur heard. No friction rub. No gallop. Pulmonary:      Effort: Pulmonary effort is normal.      Breath sounds: Normal breath sounds. No wheezing or rhonchi. Abdominal:      General: Bowel sounds are normal. There is no distension. Palpations: Abdomen is soft. Tenderness: There is no abdominal tenderness. Musculoskeletal:         General: No swelling or tenderness. Comments: Generalized weakness   Skin:     General: Skin is warm and dry. Coloration: Skin is pale. Neurological:      Mental Status: She is alert and oriented to person, place, and time. Psychiatric:         Mood and Affect: Mood normal.         Behavior: Behavior normal.         Thought Content: Thought content normal.         Judgment: Judgment normal.         Recent Results (from the past 24 hour(s))   CBC WITH AUTOMATED DIFF    Collection Time: 12/23/22  1:42 PM   Result Value Ref Range    WBC 7.0 3.6 - 11.0 K/uL    RBC 4.76 3.80 - 5.20 M/uL    HGB 13.4 11.5 - 16.0 g/dL    HCT 41.0 35.0 - 47.0 %    MCV 86.1 80.0 - 99.0 FL    MCH 28.2 26.0 - 34.0 PG    MCHC 32.7 30.0 - 36.5 g/dL    RDW 12.0 11.5 - 14.5 %    PLATELET 470 874 - 574 K/uL    MPV 9.4 8.9 - 12.9 FL    NRBC 0.0 0.0  WBC    ABSOLUTE NRBC 0.00 0.00 - 0.01 K/uL    NEUTROPHILS 67 32 - 75 %    LYMPHOCYTES 20 12 - 49 %    MONOCYTES 11 5 - 13 %    EOSINOPHILS 1 0 - 7 %    BASOPHILS 1 0 - 1 %    IMMATURE GRANULOCYTES 0 0 - 0.5 %    ABS. NEUTROPHILS 4.6 1.8 - 8.0 K/UL    ABS. LYMPHOCYTES 1.4 0.8 - 3.5 K/UL    ABS. MONOCYTES 0.8 0.0 - 1.0 K/UL    ABS. EOSINOPHILS 0.1 0.0 - 0.4 K/UL    ABS. BASOPHILS 0.1 0.0 - 0.1 K/UL    ABS. IMM. GRANS. 0.0 0.00 - 0.04 K/UL    DF AUTOMATED     METABOLIC PANEL, COMPREHENSIVE    Collection Time: 12/23/22  1:42 PM   Result Value Ref Range    Sodium 133 (L) 136 - 145 mmol/L    Potassium 4.4 3.5 - 5.1 mmol/L    Chloride 100 97 - 108 mmol/L    CO2 23 21 - 32 mmol/L    Anion gap 10 5 - 15 mmol/L    Glucose 97 65 - 100 mg/dL    BUN 14 6 - 20 mg/dL    Creatinine 0.86 0.55 - 1.02 mg/dL    BUN/Creatinine ratio 16 12 - 20      eGFR >60 >60 ml/min/1.73m2    Calcium 9.8 8.5 - 10.1 mg/dL    Bilirubin, total 0.4 0.2 - 1.0 mg/dL    AST (SGOT) 15 15 - 37 U/L    ALT (SGPT) 24 12 - 78 U/L    Alk.  phosphatase 61 45 - 117 U/L    Protein, total 7.1 6.4 - 8.2 g/dL    Albumin 4.0 3.5 - 5.0 g/dL    Globulin 3.1 2.0 - 4.0 g/dL    A-G Ratio 1.3 1.1 - 2.2     TROPONIN-HIGH SENSITIVITY    Collection Time: 12/23/22  1:42 PM   Result Value Ref Range    Troponin-High Sensitivity 11 0 - 51 ng/L   NT-PRO BNP    Collection Time: 12/23/22  1:42 PM   Result Value Ref Range    NT pro- (H) <125 pg/mL   TYPE & SCREEN    Collection Time: 12/23/22  1:42 PM   Result Value Ref Range    Crossmatch Expiration 12/26/2022,2359     ABO/Rh(D) Suman Conklin Positive     Antibody screen Negative    PTT    Collection Time: 12/23/22  1:42 PM   Result Value Ref Range    aPTT 32.2 21.2 - 34.1 sec    aPTT, therapeutic range   82 - 109 sec   PROTHROMBIN TIME + INR    Collection Time: 12/23/22  1:42 PM   Result Value Ref Range    Prothrombin time 13.4 11.9 - 14.6 sec    INR 1.0 0.9 - 1.1     COVID-19 RAPID TEST    Collection Time: 12/23/22  1:42 PM   Result Value Ref Range    COVID-19 rapid test Not Detected Not Detected     INFLUENZA A & B AG (RAPID TEST)    Collection Time: 12/23/22  2:10 PM   Result Value Ref Range    Influenza A Antigen Negative Negative      Influenza B Antigen Negative Negative     URINALYSIS W/ REFLEX CULTURE    Collection Time: 12/23/22  2:32 PM    Specimen: Urine   Result Value Ref Range    Color Yellow/Straw      Appearance Hazy (A) Clear Specific gravity 1.015 1.003 - 1.030      pH (UA) 7.0      Protein Negative Negative mg/dL    Glucose Negative Negative mg/dL    Ketone Negative Negative mg/dL    Bilirubin Negative Negative      Blood Negative Negative      Urobilinogen 0.1 (L) 0.2 - 1.0 EU/dL    Nitrites Positive (A) Negative      Leukocyte Esterase Large (A) Negative      UA:UC IF INDICATED Urine Culture Ordered (A) Culture not indicated by UA result      WBC 10-20 0 - 4 /hpf    RBC 0-5 0 - 5 /hpf    Bacteria Negative Negative /hpf       XR Results (most recent):  Results from Hospital Encounter encounter on 02/13/22    XR CHEST PORT    Narrative  Chest, AP portable    COMPARISON: None recent. The heart, mediastinum and pulmonary vasculature appear normal.  The lungs are  clear. Impression  No acute process, given limitations of portable technique. CT Results (most recent):  Results from Hospital Encounter encounter on 12/23/22    CT CODE NEURO HEAD WO CONTRAST    Narrative  INDICATION:  stroke sx's    Exam: Unenhanced head CT. No comparisons. 5 mm axial images were obtained from  the skull base to vertex. Sagittal and coronal reformats were performed. CT dose  reduction was achieved through use of a standardized protocol tailored for this  examination and automatic exposure control for dose modulation. Findings: Ventricles and cisterns are enlarged compatible with the overall  degree of volume loss. Generalized atrophy of the frontal lobes with associated  periventricular white matter changes also localized within the frontal lobes. .  There are no extra-axial fluid collections mass lesions or mass effect. There  are no extra-axial fluid collections or midline shift. No evidence of acute  intracranial hemorrhage or acute infarct. Postoperative changes to the parietal  bones bilaterally    Impression  1. Volume loss and white matter changes within the frontal lobes.  No acute  intracranial abnormality       MRI Results (most recent):  No results found for this or any previous visit. Active Problems:    TIA (transient ischemic attack) (12/23/2022)        Assessment/Plan:     TIA/r/o stroke  -Neurology consult  -CTA head[de-identified] IMPRESSION:    1. Occlusion of the right internal carotid artery at its origin. Greater than  70% stenosis of the left internal carotid artery. 2.  Occlusion of the left M1 segment with collaterals and reconstitution of the  distal MCA branches likely chronic. 3.  Reconstitution of the right internal carotid artery distally.  All  intracranial vessels are small in caliber and difficult to evaluate.  -Continue Plavix 75 mg daily    Pyelonephritis  -Urostomy tube  -Rocephin 1 gm given in ED  -urine culture pending    Hypertension  - Enalepril 2.5 mg daily  -Metoprolol 25 mg daily    Type 2 Diabetes  -Sliding scale coverage  -bedside glucose monitoring    DVT Prophylaxis: Plavix  Code Status: Full code  POA/NOK: sister Caroline    Total Time spent in direct and indirect care including assessment review of labs and coordination of services and consultations: Greater than 75 minutes      Signed By: Valentin Jimenez NP     December 23, 2022

## 2022-12-23 NOTE — PROGRESS NOTES
Spiritual Care Assessment/Progress Note  [facility]      NAME: Fadi Banks      MRN: [id number]  AGE: 61 y.o.  SEX: female  Christian Affiliation: Imelda Velasquez   Language: English     12/23/2022     Total Time (in minutes): 21     Spiritual Assessment begun in [address] [place] [address] DEPT through conversation with:         [x]Patient        [x] Family    [] Friend(s)        Reason for Consult: Emergency Department visit     Spiritual beliefs: (Please include comment if needed)     [x] Identifies with a mike tradition:         [x] Supported by a mike community:            [] Claims no spiritual orientation:           [] Seeking spiritual identity:                [] Adheres to an individual form of spirituality:           [] Not able to assess:                           Identified resources for coping:      [x] Prayer                               [] Music                  [] Guided Imagery     [x] Family/friends                 [] Pet visits     [] Devotional reading                         [] Unknown     [] Other:                                               Interventions offered during this visit: (See comments for more details)    Patient Interventions: Affirmation of mike, Affirmation of emotions/emotional suffering, Catharsis/review of pertinent events in supportive environment, Coping skills reviewed/reinforced, Guidance concerning next steps/process to be expected, Iconic (affirming the presence of God/Higher Power), Initial visit, Prayer (assurance of), Christian beliefs/image of God discussed     Family/Friend(s): Catharsis/review of pertinent events in supportive environment, Affirmation of emotions/emotional suffering, Affirmation of mike, Coping skills reviewed/reinforced, Iconic (affirming the presence of God/Higher Power), Initial Assessment, Guidance concerning next steps/process to be expected, Prayer (assurance of), Christian beliefs/image of God discussed     Plan of Care:     [] Support spiritual and/or cultural needs    [] Support AMD and/or advance care planning process      [] Support grieving process   [] Coordinate Rites and/or Rituals    [] Coordination with community clergy   [] No spiritual needs identified at this time   [] Detailed Plan of Care below (See Comments)  [] Make referral to Music Therapy  [] Make referral to Pet Therapy     [] Make referral to Addiction services  [] Make referral to [facility]  [] Make referral to Spiritual Care Partner  [] No future visits requested        [x] Contact Spiritual Care for further referrals     Comments: The purpose of the visit was to do a spiritual assessment on the patient. The patient was joined by her sister however they both shared during the visit. They mentioned being filled with the bozena of God, and that their hope was for healing. They shared that they have had their mike and that they trust God. The  listened empathically, facilitated story-telling, explored painful feelings, and provided the ministry of presence. [facility] D.Min, M.Div.  [address] can be reached by calling the  at [facility]  [phone]

## 2022-12-23 NOTE — DISCHARGE INSTRUCTIONS
Thank you for presenting to our Emergency Department for your care. Below you will find a list of your labs and imaging from your visit today if applicable. Should you have any questions regarding these results please do not hesitate to call the emergency department. Please review [employer] for a more detailed result list since the below list may not be comprehensive. Instructions on how to sign up to [employer] should be provided in this packet. Labs -     Recent Results (from the past 12 hour(s))   CBC WITH AUTOMATED DIFF    Collection Time: 12/23/22  1:42 PM   Result Value Ref Range    WBC 7.0 3.6 - 11.0 K/uL    RBC 4.76 3.80 - 5.20 M/uL    HGB 13.4 11.5 - 16.0 g/dL    HCT 41.0 35.0 - 47.0 %    MCV 86.1 80.0 - 99.0 FL    MCH 28.2 26.0 - 34.0 PG    MCHC 32.7 30.0 - 36.5 g/dL    RDW 12.0 11.5 - 14.5 %    PLATELET 810 151 - 250 K/uL    MPV 9.4 8.9 - 12.9 FL    NRBC 0.0 0.0  WBC    ABSOLUTE NRBC 0.00 0.00 - 0.01 K/uL    NEUTROPHILS 67 32 - 75 %    LYMPHOCYTES 20 12 - 49 %    MONOCYTES 11 5 - 13 %    EOSINOPHILS 1 0 - 7 %    BASOPHILS 1 0 - 1 %    IMMATURE GRANULOCYTES 0 0 - 0.5 %    ABS. NEUTROPHILS 4.6 1.8 - 8.0 K/UL    ABS. LYMPHOCYTES 1.4 0.8 - 3.5 K/UL    ABS. MONOCYTES 0.8 0.0 - 1.0 K/UL    ABS. EOSINOPHILS 0.1 0.0 - 0.4 K/UL    ABS. BASOPHILS 0.1 0.0 - 0.1 K/UL    ABS. IMM. GRANS. 0.0 0.00 - 0.04 K/UL    DF AUTOMATED     METABOLIC PANEL, COMPREHENSIVE    Collection Time: 12/23/22  1:42 PM   Result Value Ref Range    Sodium 133 (L) 136 - 145 mmol/L    Potassium 4.4 3.5 - 5.1 mmol/L    Chloride 100 97 - 108 mmol/L    CO2 23 21 - 32 mmol/L    Anion gap 10 5 - 15 mmol/L    Glucose 97 65 - 100 mg/dL    BUN 14 6 - 20 mg/dL    Creatinine 0.86 0.55 - 1.02 mg/dL    BUN/Creatinine ratio 16 12 - 20      eGFR >60 >60 ml/min/1.73m2    Calcium 9.8 8.5 - 10.1 mg/dL    Bilirubin, total 0.4 0.2 - 1.0 mg/dL    AST (SGOT) 15 15 - 37 U/L    ALT (SGPT) 24 12 - 78 U/L    Alk.  phosphatase 61 45 - 117 U/L    Protein, total 7.1 6.4 - 8.2 g/dL    Albumin 4.0 3.5 - 5.0 g/dL    Globulin 3.1 2.0 - 4.0 g/dL    A-G Ratio 1.3 1.1 - 2.2     TROPONIN-HIGH SENSITIVITY    Collection Time: 12/23/22  1:42 PM   Result Value Ref Range    Troponin-High Sensitivity 11 0 - 51 ng/L   NT-PRO BNP    Collection Time: 12/23/22  1:42 PM   Result Value Ref Range    NT pro- (H) <125 pg/mL   TYPE & SCREEN    Collection Time: 12/23/22  1:42 PM   Result Value Ref Range    Crossmatch Expiration 12/26/2022,2359     ABO/Rh(D) Kimberley Vanz Positive     Antibody screen Negative    PTT    Collection Time: 12/23/22  1:42 PM   Result Value Ref Range    aPTT 32.2 21.2 - 34.1 sec    aPTT, therapeutic range   82 - 109 sec   PROTHROMBIN TIME + INR    Collection Time: 12/23/22  1:42 PM   Result Value Ref Range    Prothrombin time 13.4 11.9 - 14.6 sec    INR 1.0 0.9 - 1.1     COVID-19 RAPID TEST    Collection Time: 12/23/22  1:42 PM   Result Value Ref Range    COVID-19 rapid test Not Detected Not Detected     INFLUENZA A & B AG (RAPID TEST)    Collection Time: 12/23/22  2:10 PM   Result Value Ref Range    Influenza A Antigen Negative Negative      Influenza B Antigen Negative Negative     URINALYSIS W/ REFLEX CULTURE    Collection Time: 12/23/22  2:32 PM    Specimen: Urine   Result Value Ref Range    Color Yellow/Straw      Appearance Hazy (A) Clear      Specific gravity 1.015 1.003 - 1.030      pH (UA) 7.0      Protein Negative Negative mg/dL    Glucose Negative Negative mg/dL    Ketone Negative Negative mg/dL    Bilirubin Negative Negative      Blood Negative Negative      Urobilinogen 0.1 (L) 0.2 - 1.0 EU/dL    Nitrites Positive (A) Negative      Leukocyte Esterase Large (A) Negative      UA:UC IF INDICATED Urine Culture Ordered (A) Culture not indicated by UA result      WBC 10-20 0 - 4 /hpf    RBC 0-5 0 - 5 /hpf    Bacteria Negative Negative /hpf       Radiologic Studies -   CTA CODE NEURO HEAD AND NECK W CONT   Final Result      1.   Occlusion of the right internal carotid artery at its origin. Greater than   70% stenosis of the left internal carotid artery. 2.  Occlusion of the left M1 segment with collaterals and reconstitution of the   distal MCA branches likely chronic. 3.  Reconstitution of the right internal carotid artery distally. All   intracranial vessels are small in caliber and difficult to evaluate. CT CODE NEURO HEAD WO CONTRAST   Final Result   1. Volume loss and white matter changes within the frontal lobes. No acute   intracranial abnormality        CT Results  (Last 48 hours)                 12/23/22 1409  CTA CODE NEURO HEAD AND NECK W CONT Final result    Impression:      1. Occlusion of the right internal carotid artery at its origin. Greater than   70% stenosis of the left internal carotid artery. 2.  Occlusion of the left M1 segment with collaterals and reconstitution of the   distal MCA branches likely chronic. 3.  Reconstitution of the right internal carotid artery distally. All   intracranial vessels are small in caliber and difficult to evaluate. Narrative:  EXAM: CTA CODE NEURO HEAD AND NECK W CONT       INDICATION: stroke sx's       COMPARISON: None. CONTRAST: 100 mL of Isovue-370. TECHNIQUE:  Unenhanced  images were obtained to localize the volume for   acquisition. Multislice helical axial CT angiography was performed from the   aortic arch to the top of the head during uneventful rapid bolus intravenous   contrast administration. Coronal and sagittal reformations and 3D post   processing was performed. CT dose reduction was achieved through use of a   standardized protocol tailored for this examination and automatic exposure   control for dose modulation. FINDINGS:       CTA NECK   There is conventional three vessel arch anatomy. Possible stenosis at the origin   of the bilateral common carotid arteries. Yuli Stands        % of right carotid artery stenosis: Greater than 70%   % of left carotid artery stenosis: 100%   Measurements utilized NASCET criteria. NASCET method was utilized for calculating stenosis. The vertebral arteries are codominant and patent. The cervical soft tissues are   unremarkable. There are degenerative changes of the cervical spine. CTA HEAD   The vertebral arteries are codominant. The basilar artery and its branches are   normal. There is reconstitution of the right internal carotid artery in the   petrous portion. The distal right internal carotid artery is severely small in   caliber. Evaluation of the distal vessels is limited by bolus quality as well as   a small caliber vessels. The distal left internal carotid artery is markedly   small in caliber as well. There is occlusion of the left M1 segment with   multiple small collaterals and reconstitution of the distal branches. Occlusion   of the left A1 is also present. . . There is no aneurysm. Craniotomy changes are   noted bilaterally. . Chronic small vessel ischemic disease. 12/23/22 1409  CT CODE NEURO HEAD WO CONTRAST Final result    Impression:  1. Volume loss and white matter changes within the frontal lobes. No acute   intracranial abnormality       Narrative:  INDICATION:  stroke sx's        Exam: Unenhanced head CT. No comparisons. 5 mm axial images were obtained from   the skull base to vertex. Sagittal and coronal reformats were performed. CT dose   reduction was achieved through use of a standardized protocol tailored for this   examination and automatic exposure control for dose modulation. Findings: Ventricles and cisterns are enlarged compatible with the overall   degree of volume loss. Generalized atrophy of the frontal lobes with associated   periventricular white matter changes also localized within the frontal lobes. .   There are no extra-axial fluid collections mass lesions or mass effect. There   are no extra-axial fluid collections or midline shift.  No evidence of acute   intracranial hemorrhage or acute infarct. Postoperative changes to the parietal   bones bilaterally                 CXR Results  (Last 48 hours)      None          ____________________________________________________________________________    You decided to leave the Emergency Department Against Medical Advice. I have voiced my concerns that a full evaluation and/or treatment had not occurred. You still need continued evaluation to determine if your symptoms are caused by a condition that present risk of death,r morbidity, permanent disability, prolonged hospitalization, prolonged illness, among others. For this reason, we urge you to return or go to any emergency department to continue your care. Leaving against medical advice from our facility does not preclude you from coming back to continue your care, in fact, we encourage you to. Call 911 if you experience any worsening or new symptoms after leaving our Emergency Department. Please seek evaluation promptly. The exam and treatment you received in the Emergency Department were for an urgent problem and are not intended as complete care. It is important that you follow-up with a doctor, nurse practitioner, or physician assistant to:  (1) confirm your diagnosis,  (2) re-evaluation of changes in your illness and treatment, and  (3) for ongoing care. If your symptoms become worse or you do not improve as expected and you are unable to reach your usual health care provider, you should return to the Emergency Department. We are available 24 hours a day. If you change your mind and decide to come back to the Emergency Department to continue your care you are more than welcome to come back here. We encourage that. Please take your discharge instructions with you when you go to your follow-up appointment or if you go to another Emergency Department to continue your care.      If a prescription has been provided, please have it filled as soon as possible to prevent a delay in treatment. Read the entire medication instruction sheet provided to you by the pharmacy. If you have any questions or reservations about taking the medication due to side effects or interactions with other medications, please call your primary care physician or contact the ER to speak with the charge nurse.

## 2022-12-23 NOTE — PROGRESS NOTES
SPEECH LANGUAGE PATHOLOGY BEDSIDE SWALLOW EVALUATION  Patient: Tu Roque (58 y.o. female)  Date: 12/23/2022  Primary Diagnosis: No admission diagnoses are documented for this encounter. Precautions: aspiration       ASSESSMENT :  Based on the objective data described below, the patient's swallow appears largely [facility] at this time. No facial asymmetry noted. Patient is edentulous. Caregiver reports regular, diabetic diet at home. 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 Hx of multiple CVA's. Caregiver reports patient w/ onset of difficulty understanding and not responding to questions appropriately. ?'able aphasia. Administered thin liquids via straw and cup. Patient ingested w/ consecutive swallows. Swallow initiation timely. HLE and protraction adequate to digital palpation. No clinical indicators of penetration or aspiration noted. Patient was able to masticate hard solid despite edentulous. No pharyngeal difficulty noted. Patient will benefit from skilled intervention to address the above impairments. Patients rehabilitation potential is considered to be Good     PLAN :  Recommendations and Planned Interventions:  Easy to chew, thin. Aspiration precautions. Full speech-language evaluation if not returned to baseline. Frequency/Duration: Patient will be followed by speech-language pathology 3 times a week to address goals. Discharge Recommendations: To Be Determined     SUBJECTIVE:   Patient seen at bedside in stretcher in ED    OBJECTIVE:     CXR Results  (Last 48 hours)      None           CT Results  (Last 48 hours)                 12/23/22 1409  CTA CODE NEURO HEAD AND NECK W CONT Final result    Impression:      1. Occlusion of the right internal carotid artery at its origin. Greater than   70% stenosis of the left internal carotid artery. 2.  Occlusion of the left M1 segment with collaterals and reconstitution of the   distal MCA branches likely chronic.        3. Reconstitution of the right internal carotid artery distally. All   intracranial vessels are small in caliber and difficult to evaluate. Narrative:  EXAM: CTA CODE NEURO HEAD AND NECK W CONT       INDICATION: stroke sx's       COMPARISON: None. CONTRAST: 100 mL of Isovue-370. TECHNIQUE:  Unenhanced  images were obtained to localize the volume for   acquisition. Multislice helical axial CT angiography was performed from the   aortic arch to the top of the head during uneventful rapid bolus intravenous   contrast administration. Coronal and sagittal reformations and 3D post   processing was performed. CT dose reduction was achieved through use of a   standardized protocol tailored for this examination and automatic exposure   control for dose modulation. FINDINGS:       CTA NECK   There is conventional three vessel arch anatomy. Possible stenosis at the origin   of the bilateral common carotid arteries. Nallely Horace % of right carotid artery stenosis: Greater than 70%   % of left carotid artery stenosis: 100%   Measurements utilized NASCET criteria. NASCET method was utilized for calculating stenosis. The vertebral arteries are codominant and patent. The cervical soft tissues are   unremarkable. There are degenerative changes of the cervical spine. CTA HEAD   The vertebral arteries are codominant. The basilar artery and its branches are   normal. There is reconstitution of the right internal carotid artery in the   petrous portion. The distal right internal carotid artery is severely small in   caliber. Evaluation of the distal vessels is limited by bolus quality as well as   a small caliber vessels. The distal left internal carotid artery is markedly   small in caliber as well. There is occlusion of the left M1 segment with   multiple small collaterals and reconstitution of the distal branches. Occlusion   of the left A1 is also present. . . There is no aneurysm. Craniotomy changes are   noted bilaterally. . Chronic small vessel ischemic disease. 12/23/22 1409  CT CODE NEURO HEAD WO CONTRAST Final result    Impression:  1. Volume loss and white matter changes within the frontal lobes. No acute   intracranial abnormality       Narrative:  INDICATION:  stroke sx's        Exam: Unenhanced head CT. No comparisons. 5 mm axial images were obtained from   the skull base to vertex. Sagittal and coronal reformats were performed. CT dose   reduction was achieved through use of a standardized protocol tailored for this   examination and automatic exposure control for dose modulation. Findings: Ventricles and cisterns are enlarged compatible with the overall   degree of volume loss. Generalized atrophy of the frontal lobes with associated   periventricular white matter changes also localized within the frontal lobes. .   There are no extra-axial fluid collections mass lesions or mass effect. There   are no extra-axial fluid collections or midline shift. No evidence of acute   intracranial hemorrhage or acute infarct. Postoperative changes to the parietal   bones bilaterally                    No past medical history on file. No past surgical history on file. Prior Level of Function/Home Situation: required assistance     Diet prior to admission: regular, thin  Current Diet:  NPO   Cognitive and Communication Status:  Neurologic State: Alert  Orientation Level: Oriented X4  Cognition: Follows commands        Swallowing Evaluation:   Oral Assessment:  Oral Assessment  Labial: No impairment  Dentition: Edentulous  Oral Hygiene: WFL  Lingual: No impairment  Velum: No impairment  Mandible: No impairment  P.O. Trials:  Patient Position: upright in stretcher  Vocal quality prior to P.O.: No impairment  Consistency Presented: Thin liquid; Solid  How Presented: Successive swallows;Straw;Self-fed/presented;SLP-fed/presented     Bolus Acceptance: No impairment  Bolus Formation/Control: No impairment     Propulsion: No impairment  Oral Residue: None  Initiation of Swallow: No impairment  Laryngeal Elevation: Functional  Aspiration Signs/Symptoms: None  Pharyngeal Phase Characteristics: No impairment, issues, or problems              Oral Phase Severity: Minimal  Pharyngeal Phase Severity : No impairment  Voice:        Vocal Quality: No impairment          Pain:  Pain Scale 1: Numeric (0 - 10)  Pain Intensity 1: 5  Pain Location 1: Head    After treatment:   Patient left in no apparent distress in bed, Nursing notified, and Caregiver / family present    COMMUNICATION/EDUCATION:   Patient was educated regarding purpose of SLP assessment, POC, diet recs and sw safety precautions. Patient demonstrated [address] understanding as evidenced by verbal understanding. The patient's plan of care including recommendations, planned interventions, and recommended diet changes were discussed with: Registered nurse. Patient/family have participated as able in goal setting and plan of care. Thank you for this referral.  Shelby Barrios, SLP M.S. CCC-SLP  Time Calculation: 12 mins           Problem: Dysphagia (Adult)  Goal: *Acute Goals and Plan of Care (Insert Text)  Description: Speech Therapy Swallow Goals  Initiated 12/23/2022  -Patient will tolerate easy to chew diet with thin liquids without clinical indicators of aspiration given no cues within 5-7day(s). -Patient will tolerate PO trials without clinical indicators of aspiration given no cues within 5-7 day(s). -Patient will demonstrate understanding of swallow safety precautions and aspiration precautions, diet recs with no cues within 5-7 day(s).     -Participate in speech/language evaluation as indicated    -Patient/caregiver goal: To return to baseline  Outcome: Not Progressing Towards Goal

## 2022-12-24 LAB
ATRIAL RATE: 69 BPM
CALCULATED P AXIS, ECG09: 71 DEGREES
CALCULATED R AXIS, ECG10: 74 DEGREES
CALCULATED T AXIS, ECG11: 80 DEGREES
DIAGNOSIS, 93000: NORMAL
P-R INTERVAL, ECG05: 116 MS
Q-T INTERVAL, ECG07: 390 MS
QRS DURATION, ECG06: 78 MS
QTC CALCULATION (BEZET), ECG08: 417 MS
VENTRICULAR RATE, ECG03: 69 BPM

## 2022-12-24 NOTE — ED NOTES
Pt wanting to leave AMA due to holiday, ED MD informed, Pt spoke to MD(explained risk of leaving). Admitting MD Lesley bedside for admission assessment, explained risk for leaving AMA. Pt and family member continued wanting to leave, Charge RN notified of [place] discharge.

## 2022-12-25 LAB
BACTERIA SPEC CULT: NORMAL
BACTERIA SPEC CULT: NORMAL
COLONY COUNT,CNT: NORMAL
SPECIAL REQUESTS,SREQ: NORMAL

## 2022-12-27 LAB
BACTERIA SPEC CULT: ABNORMAL
BACTERIA SPEC CULT: ABNORMAL
COLONY COUNT,CNT: ABNORMAL
SPECIAL REQUESTS,SREQ: ABNORMAL

## 2023-04-21 DIAGNOSIS — Z12.31 SCREENING MAMMOGRAM FOR HIGH-RISK PATIENT: Primary | ICD-10-CM

## 2023-04-29 ENCOUNTER — HOSPITAL ENCOUNTER (EMERGENCY)
Age: 58
Discharge: HOME | End: 2023-04-29
Attending: EMERGENCY MEDICINE
Payer: MEDICARE

## 2023-04-29 ENCOUNTER — APPOINTMENT (OUTPATIENT)
Dept: CT IMAGING | Age: 58
End: 2023-04-29
Attending: EMERGENCY MEDICINE
Payer: MEDICARE

## 2023-04-29 VITALS
RESPIRATION RATE: 18 BRPM | OXYGEN SATURATION: 98 % | TEMPERATURE: 97.9 F | DIASTOLIC BLOOD PRESSURE: 57 MMHG | HEIGHT: 61 IN | SYSTOLIC BLOOD PRESSURE: 147 MMHG | HEART RATE: 72 BPM | BODY MASS INDEX: 20.39 KG/M2 | WEIGHT: 108 LBS

## 2023-04-29 DIAGNOSIS — N39.0 URINARY TRACT INFECTION WITHOUT HEMATURIA, SITE UNSPECIFIED: Primary | ICD-10-CM

## 2023-04-29 LAB
ANION GAP SERPL CALC-SCNC: 6 MMOL/L (ref 5–15)
APPEARANCE UR: CLEAR
BACTERIA URNS QL MICRO: NEGATIVE /HPF
BASOPHILS # BLD: 0 K/UL (ref 0–0.1)
BASOPHILS NFR BLD: 0 % (ref 0–1)
BILIRUB UR QL: NEGATIVE
BUN SERPL-MCNC: 14 MG/DL (ref 6–20)
BUN/CREAT SERPL: 15 (ref 12–20)
CA-I BLD-MCNC: 9.5 MG/DL (ref 8.5–10.1)
CHLORIDE SERPL-SCNC: 97 MMOL/L (ref 97–108)
CO2 SERPL-SCNC: 25 MMOL/L (ref 21–32)
COLOR UR: ABNORMAL
CREAT SERPL-MCNC: 0.91 MG/DL (ref 0.55–1.02)
DIFFERENTIAL METHOD BLD: ABNORMAL
EOSINOPHIL # BLD: 0 K/UL (ref 0–0.4)
EOSINOPHIL NFR BLD: 0 % (ref 0–7)
EPITH CASTS URNS QL MICRO: NORMAL /LPF
ERYTHROCYTE [DISTWIDTH] IN BLOOD BY AUTOMATED COUNT: 12.5 % (ref 11.5–14.5)
GLUCOSE SERPL-MCNC: 176 MG/DL (ref 65–100)
GLUCOSE UR STRIP.AUTO-MCNC: NEGATIVE MG/DL
HCT VFR BLD AUTO: 41.2 % (ref 35–47)
HGB BLD-MCNC: 13.7 G/DL (ref 11.5–16)
HGB UR QL STRIP: NEGATIVE
IMM GRANULOCYTES # BLD AUTO: 0.1 K/UL (ref 0–0.04)
IMM GRANULOCYTES NFR BLD AUTO: 0 % (ref 0–0.5)
KETONES UR QL STRIP.AUTO: 5 MG/DL
LEUKOCYTE ESTERASE UR QL STRIP.AUTO: ABNORMAL
LYMPHOCYTES # BLD: 1.4 K/UL (ref 0.8–3.5)
LYMPHOCYTES NFR BLD: 11 % (ref 12–49)
MCH RBC QN AUTO: 28.8 PG (ref 26–34)
MCHC RBC AUTO-ENTMCNC: 33.3 G/DL (ref 30–36.5)
MCV RBC AUTO: 86.6 FL (ref 80–99)
MONOCYTES # BLD: 1.3 K/UL (ref 0–1)
MONOCYTES NFR BLD: 10 % (ref 5–13)
NEUTS SEG # BLD: 10.1 K/UL (ref 1.8–8)
NEUTS SEG NFR BLD: 79 % (ref 32–75)
NITRITE UR QL STRIP.AUTO: NEGATIVE
NRBC # BLD: 0 K/UL (ref 0–0.01)
NRBC BLD-RTO: 0 PER 100 WBC
PH UR STRIP: 7 (ref 5–8)
PLATELET # BLD AUTO: 333 K/UL (ref 150–400)
PMV BLD AUTO: 9.8 FL (ref 8.9–12.9)
POTASSIUM SERPL-SCNC: 4 MMOL/L (ref 3.5–5.1)
PROT UR STRIP-MCNC: NEGATIVE MG/DL
RBC # BLD AUTO: 4.76 M/UL (ref 3.8–5.2)
RBC #/AREA URNS HPF: NORMAL /HPF (ref 0–5)
SODIUM SERPL-SCNC: 128 MMOL/L (ref 136–145)
SP GR UR REFRACTOMETRY: >1.03 (ref 1–1.03)
UROBILINOGEN UR QL STRIP.AUTO: 0.1 EU/DL (ref 0.1–1)
WBC # BLD AUTO: 12.8 K/UL (ref 3.6–11)
WBC URNS QL MICRO: NORMAL /HPF (ref 0–4)

## 2023-04-29 PROCEDURE — 96374 THER/PROPH/DIAG INJ IV PUSH: CPT

## 2023-04-29 PROCEDURE — 36415 COLL VENOUS BLD VENIPUNCTURE: CPT

## 2023-04-29 PROCEDURE — 99285 EMERGENCY DEPT VISIT HI MDM: CPT

## 2023-04-29 PROCEDURE — 74011250637 HC RX REV CODE- 250/637: Performed by: EMERGENCY MEDICINE

## 2023-04-29 PROCEDURE — 70450 CT HEAD/BRAIN W/O DYE: CPT

## 2023-04-29 PROCEDURE — 81001 URINALYSIS AUTO W/SCOPE: CPT

## 2023-04-29 PROCEDURE — 74011250636 HC RX REV CODE- 250/636: Performed by: EMERGENCY MEDICINE

## 2023-04-29 PROCEDURE — 85025 COMPLETE CBC W/AUTO DIFF WBC: CPT

## 2023-04-29 PROCEDURE — 74011000636 HC RX REV CODE- 636: Performed by: EMERGENCY MEDICINE

## 2023-04-29 PROCEDURE — 80048 BASIC METABOLIC PNL TOTAL CA: CPT

## 2023-04-29 PROCEDURE — 70496 CT ANGIOGRAPHY HEAD: CPT

## 2023-04-29 RX ORDER — SULFAMETHOXAZOLE AND TRIMETHOPRIM 800; 160 MG/1; MG/1
1 TABLET ORAL
Status: COMPLETED | OUTPATIENT
Start: 2023-04-29 | End: 2023-04-29

## 2023-04-29 RX ORDER — ACETAMINOPHEN 500 MG
1000 TABLET ORAL ONCE
Status: COMPLETED | OUTPATIENT
Start: 2023-04-29 | End: 2023-04-29

## 2023-04-29 RX ORDER — MORPHINE SULFATE 2 MG/ML
2 INJECTION, SOLUTION INTRAMUSCULAR; INTRAVENOUS ONCE
Status: COMPLETED | OUTPATIENT
Start: 2023-04-29 | End: 2023-04-29

## 2023-04-29 RX ORDER — SULFAMETHOXAZOLE AND TRIMETHOPRIM 800; 160 MG/1; MG/1
1 TABLET ORAL 2 TIMES DAILY
Qty: 10 TABLET | Refills: 0 | Status: SHIPPED | OUTPATIENT
Start: 2023-04-29 | End: 2023-05-04

## 2023-04-29 RX ADMIN — SODIUM CHLORIDE 1000 ML: 9 INJECTION, SOLUTION INTRAVENOUS at 13:36

## 2023-04-29 RX ADMIN — SULFAMETHOXAZOLE AND TRIMETHOPRIM 1 TABLET: 800; 160 TABLET ORAL at 16:27

## 2023-04-29 RX ADMIN — IOPAMIDOL 100 ML: 755 INJECTION, SOLUTION INTRAVENOUS at 11:42

## 2023-04-29 RX ADMIN — ACETAMINOPHEN 1000 MG: 500 TABLET ORAL at 11:14

## 2023-04-29 RX ADMIN — MORPHINE SULFATE 2 MG: 2 INJECTION, SOLUTION INTRAMUSCULAR; INTRAVENOUS at 11:14

## 2023-04-29 NOTE — ED TRIAGE NOTES
Started with headache last night, medicated with tylenol, pain continues. Hx of cva/aneurysm. Pt not acting at baseline.

## 2023-04-29 NOTE — DISCHARGE INSTRUCTIONS
Thank you! Thank you for allowing me to care for you in the emergency department. It is my goal to provide you with excellent care. If you have not received excellent quality care, please ask to speak to the nurse manager. Please fill out the survey that will come to you by mail or email since we listen to your feedback! Below you will find a list of your tests from today's visit. Should you have any questions, please do not hesitate to call the emergency department. Labs  Recent Results (from the past 12 hour(s))   CBC WITH AUTOMATED DIFF    Collection Time: 04/29/23 10:28 AM   Result Value Ref Range    WBC 12.8 (H) 3.6 - 11.0 K/uL    RBC 4.76 3.80 - 5.20 M/uL    HGB 13.7 11.5 - 16.0 g/dL    HCT 41.2 35.0 - 47.0 %    MCV 86.6 80.0 - 99.0 FL    MCH 28.8 26.0 - 34.0 PG    MCHC 33.3 30.0 - 36.5 g/dL    RDW 12.5 11.5 - 14.5 %    PLATELET 974 236 - 889 K/uL    MPV 9.8 8.9 - 12.9 FL    NRBC 0.0 0.0  WBC    ABSOLUTE NRBC 0.00 0.00 - 0.01 K/uL    NEUTROPHILS 79 (H) 32 - 75 %    LYMPHOCYTES 11 (L) 12 - 49 %    MONOCYTES 10 5 - 13 %    EOSINOPHILS 0 0 - 7 %    BASOPHILS 0 0 - 1 %    IMMATURE GRANULOCYTES 0 0 - 0.5 %    ABS. NEUTROPHILS 10.1 (H) 1.8 - 8.0 K/UL    ABS. LYMPHOCYTES 1.4 0.8 - 3.5 K/UL    ABS. MONOCYTES 1.3 (H) 0.0 - 1.0 K/UL    ABS. EOSINOPHILS 0.0 0.0 - 0.4 K/UL    ABS. BASOPHILS 0.0 0.0 - 0.1 K/UL    ABS. IMM.  GRANS. 0.1 (H) 0.00 - 0.04 K/UL    DF AUTOMATED     METABOLIC PANEL, BASIC    Collection Time: 04/29/23 10:28 AM   Result Value Ref Range    Sodium 128 (L) 136 - 145 mmol/L    Potassium 4.0 3.5 - 5.1 mmol/L    Chloride 97 97 - 108 mmol/L    CO2 25 21 - 32 mmol/L    Anion gap 6 5 - 15 mmol/L    Glucose 176 (H) 65 - 100 mg/dL    BUN 14 6 - 20 mg/dL    Creatinine 0.91 0.55 - 1.02 mg/dL    BUN/Creatinine ratio 15 12 - 20      eGFR >60 >60 ml/min/1.73m2    Calcium 9.5 8.5 - 10.1 mg/dL   URINALYSIS W/ RFLX MICROSCOPIC    Collection Time: 04/29/23  3:20 PM   Result Value Ref Range    Color Yellow/Straw      Appearance Clear Clear      Specific gravity >1.030 (H) 1.003 - 1.030    pH (UA) 7.0 5.0 - 8.0      Protein Negative Negative mg/dL    Glucose Negative Negative mg/dL    Ketone 5 (A) Negative mg/dL    Bilirubin Negative Negative      Blood Negative Negative      Urobilinogen 0.1 0.1 - 1.0 EU/dL    Nitrites Negative Negative      Leukocyte Esterase Moderate (A) Negative     URINE MICROSCOPIC    Collection Time: 04/29/23  3:20 PM   Result Value Ref Range    WBC 10-20 0 - 4 /hpf    RBC 5-10 0 - 5 /hpf    Epithelial cells Few Few /lpf    Bacteria Negative Negative /hpf       Radiologic Studies  CT HEAD WO CONT   Final Result   No evidence of acute intracranial abnormality. Stable postsurgical changes of remote right craniectomy and left craniectomy. Chronic left frontal and bilateral ganglial capsular old lacunar infarcts. Chronic microangiopathic white matter disease. CTA HEAD NECK W CONT           CT Results  (Last 48 hours)                 04/29/23 1141  CT HEAD WO CONT Final result    Impression:  No evidence of acute intracranial abnormality. Stable postsurgical changes of remote right craniectomy and left craniectomy. Chronic left frontal and bilateral ganglial capsular old lacunar infarcts. Chronic microangiopathic white matter disease. Narrative:  EXAM: CT HEAD WO CONT       INDICATION: headache, hx of cva       COMPARISON: 12/23/2022 CT. CONTRAST: None. TECHNIQUE: Unenhanced CT of the head was performed using 5 mm images. Brain and   bone windows were generated. Coronal and sagittal reformats. CT dose reduction   was achieved through use of a standardized protocol tailored for this   examination and automatic exposure control for dose modulation. FINDINGS:   Redemonstration of right pterional craniectomy and left temporoparietal   craniotomy, similar to prior study.  Redemonstration of left frontal   encephalomalacia, from remote infarct. Additional patchy bilateral ganglial   capsular old lacunar infarcts. Patchy periventricular and deep white matter   ill-defined hypodensities, nonspecific and likely microangiopathic white matter   disease. No evidence of acute infarct. There is no intracranial hemorrhage,   extra-axial collection, or mass effect. The basilar cisterns are open. No   obvious mass lesion, allowing for lack of IV contrast demonstration. Mild diffuse mucosal thickening of the paranasal sinuses. The mastoids are free   of fluid bilaterally. [facility] 04/29/23 1141  CTA HEAD NECK W CONT Preliminary result      This result has not been signed. Information might be incomplete. Narrative:  PRELIMINARY REPORT:       Ill-defined chronic appearing small vessel tangle transformation of the left MCA   with distal reconstitution of the M2/M3 and branches, indeterminate. Diffusely stenotic intracranial right ICA. 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 At least moderate to severe stenosis of proximal left ICA. At least 40-50% narrowing of the proximal right common carotid artery due to   predominantly noncalcified plaques (image 22 and 34 series 501). Densely   calcified plaques on the aortic arch and aortic branches origins preclude   optimal evaluation of at least mild associated ostial narrowing. IMPRESSION high-grade stenosis of the proximal cervical left ICA. Preliminary report was provided by Dr. Tamika Barakat, the on-call radiologist, at   1:26 PM on 4/29/2023       Final report to follow. CXR Results  (Last 48 hours)      None          ------------------------------------------------------------------------------------------------------------  The exam and treatment you received in the Emergency Department were for an urgent problem and are not intended as complete care.  It is important that you follow-up with a doctor, nurse practitioner, or physician assistant to:  (1) confirm your diagnosis,  (2) re-evaluation of changes in your illness and treatment, and  (3) for ongoing care. Please take your discharge instructions with you when you go to your follow-up appointment. If you have any problem arranging a follow-up appointment, contact the Emergency Department. If your symptoms become worse or you do not improve as expected and you are unable to reach your health care provider, please return to the Emergency Department. We are available 24 hours a day. If a prescription has been provided, please have it filled as soon as possible to prevent a delay in treatment. If you have any questions or reservations about taking the medication due to side effects or interactions with other medications, please call your primary care provider or contact the ER.

## 2023-04-29 NOTE — ED PROVIDER NOTES
[facility] EMERGENCY DEPT  EMERGENCY DEPARTMENT HISTORY AND PHYSICAL EXAM      Date: 4/29/2023  Patient Name: Steve Jensen  MRN: [id number]  YOB: 1965  Date of evaluation: 4/29/2023  Provider: Darrion Otero MD   Note Started: 1:37 PM 4/29/23    HISTORY OF PRESENT ILLNESS     Chief Complaint   Patient presents with    Headache       History Provided By: Patient    HPI: Steve Jensen is a 61 y.o. female past medical history of stroke, aneurysm, diabetes and hypertension presents for evaluation of acute onset headache around 6 PM yesterday. Patient states the headache is gradually worsening and she rates it as a 10 out of 10. Family noted patient seems a little bit more lethargic than normal and slow to answer questions. She has not had any change in her speech, weakness, falls or any other symptoms. She is compliant with her medications which does include Plavix. PAST MEDICAL HISTORY   Past Medical History:  Past Medical History:   Diagnosis Date    Aneurysm ([facility] [address]     [facility]     Hypertension     Stroke [facility])        Past Surgical History:  History reviewed. No pertinent surgical history. Family History:  History reviewed. No pertinent family history. Social History:  Social History     Tobacco Use    Smoking status: Unknown       Allergies:  No Known Allergies    PCP: Christine Ramires MD    Current Meds:   Discharge Medication List as of 4/29/2023  4:22 PM        CONTINUE these medications which have NOT CHANGED    Details   SITagliptin (Januvia) 100 mg tablet Take 100 mg by mouth daily. , Historical Med      clopidogreL (PLAVIX) 75 mg tab Take 75 mg by mouth daily. , Historical Med      enalapril (VASOTEC) 2.5 mg tablet Take 2.5 mg by mouth daily. , Historical Med      lovastatin (MEVACOR) 40 mg tablet Take 40 mg by mouth daily. , Historical Med      metFORMIN (GLUCOPHAGE) 1,000 mg tablet Take 1,000 mg by mouth two (2) times a day., Historical Med      metoprolol succinate (TOPROL-XL) 25 mg XL tablet Take 25 mg by mouth daily. , Historical Med             PHYSICAL EXAM     ED Triage Vitals [04/29/23 1001]   ED Encounter Vitals Group      BP (!) 131/57      Pulse (Heart Rate) 72      Resp Rate 18      Temp 97.9 °F (36.6 °C)      Temp src       O2 Sat (%) 99 %      Weight 108 lb      Height 5' 1\"      Physical Exam  Vitals and nursing note reviewed. Constitutional:       Appearance: Normal appearance. She is normal weight. HENT:      Head: Normocephalic and atraumatic. Nose: Nose normal.      Mouth/Throat:      Mouth: Mucous membranes are moist.   Eyes:      Conjunctiva/sclera: Conjunctivae normal.   Cardiovascular:      Rate and Rhythm: Normal rate. Pulses: Normal pulses. Pulmonary:      Effort: Pulmonary effort is normal. No respiratory distress. Abdominal:      Comments: Urostomy with clear yellow urine in bag   Musculoskeletal:         General: No swelling or deformity. Normal range of motion. Skin:     General: Skin is warm and dry. Findings: No rash. Neurological:      General: No focal deficit present. Mental Status: She is alert. Psychiatric:         Mood and Affect: Mood normal.         Behavior: Behavior normal.         SCREENINGS              LAB, EKG AND DIAGNOSTIC RESULTS   Labs:  No results found for this or any previous visit (from the past 12 hour(s)). EKG: Initial EKG interpreted by me. Not Applicable    Radiologic Studies:  Non-plain film images such as CT, Ultrasound and MRI are read by the radiologist. Plain radiographic images are visualized and preliminarily interpreted by the ED Physician with the following findings: Not Applicable    Interpretation per the Radiologist below, if available at the time of this note:  CT HEAD WO CONT    Result Date: 4/29/2023  EXAM: CT HEAD WO CONT INDICATION: headache, hx of cva COMPARISON: 12/23/2022 CT. CONTRAST: None. TECHNIQUE: Unenhanced CT of the head was performed using 5 mm images.  Brain and bone windows were generated. Coronal and sagittal reformats. CT dose reduction was achieved through use of a standardized protocol tailored for this examination and automatic exposure control for dose modulation. FINDINGS: Redemonstration of right pterional craniectomy and left temporoparietal craniotomy, similar to prior study. Redemonstration of left frontal encephalomalacia, from remote infarct. Additional patchy bilateral ganglial capsular old lacunar infarcts. Patchy periventricular and deep white matter ill-defined hypodensities, nonspecific and likely microangiopathic white matter disease. No evidence of acute infarct. There is no intracranial hemorrhage, extra-axial collection, or mass effect. The basilar cisterns are open. No obvious mass lesion, allowing for lack of IV contrast demonstration. Mild diffuse mucosal thickening of the paranasal sinuses. The mastoids are free of fluid bilaterally. .     No evidence of acute intracranial abnormality. Stable postsurgical changes of remote right craniectomy and left craniectomy. Chronic left frontal and bilateral ganglial capsular old lacunar infarcts. Chronic microangiopathic white matter disease. CTA HEAD NECK W CONT    Result Date: 4/29/2023  PRELIMINARY REPORT: Ill-defined chronic appearing small vessel tangle transformation of the left MCA with distal reconstitution of the M2/M3 and branches, indeterminate. Diffusely stenotic intracranial right ICA. 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 At least moderate to severe stenosis of proximal left ICA. At least 40-50% narrowing of the proximal right common carotid artery due to predominantly noncalcified plaques (image 22 and 34 series 501). Densely calcified plaques on the aortic arch and aortic branches origins preclude optimal evaluation of at least mild associated ostial narrowing. IMPRESSION high-grade stenosis of the proximal cervical left ICA.  Preliminary report was provided by  Mckay Obrien, the on-call radiologist, at 1:26 PM on 4/29/2023 Final report to follow. EXAM:  CTA HEAD NECK W CONT INDICATION:   Headache, lethargy, history of brain aneurysm COMPARISON:  None. CONTRAST:  100 mL of Isovue-370. TECHNIQUE:  Unenhanced  images were obtained to localize the volume for acquisition. Multislice helical axial CT angiography was performed from the aortic arch to the top of the head during uneventful rapid bolus intravenous contrast administration. Coronal and sagittal reformations and 3D/MIP  post processing were performed. CT dose reduction was achieved through use of a standardized protocol tailored for this examination and automatic exposure control for dose modulation. This study was analyzed by the [address] ai algorithm. FINDINGS: Redemonstration of right pterional craniectomy and left temporoparietal craniotomy. CTA Head: There is high-grade stenosis versus occlusion of the left MCA with cavernous transformation/replacement by tangle of small bases, nonspecific. Distal reconstitution of the M2/M3 and branches, noted. Chronic appearing intracranial right ICA diffuse stenosis. The left intracranial internal carotid, bilateral anterior cerebral, and right middle cerebral arteries are patent. The anterior communicating artery is patent. There is no evidence of large vessel occlusion or flow-limiting stenosis of the intracranial vertebral arteries, basilar artery, or posterior cerebral arteries. There is patent left posterior communicating artery. There is no evidence of aneurysm. The dural venous sinuses and deep cerebral venous system are patent. No evidence of enhancing mass or abnormal enhancement. Brain parenchyma demonstrates no suggestion of acute infarct. CTA NECK: NASCET method was utilized for calculating stenosis. The aortic arch branching is unremarkable.  Extensive predominantly calcified aortic plaques extending into the proximal aspect of the aortic arch branches and contributing to moderate to severe ostial narrowing of the left common carotid artery. There is at least moderate proximal right common carotid artery stenosis from predominantly noncalcified plaques (image 23 and 33 of series 501). Patchy calcified plaques along the proximal left subclavian artery without significant luminal narrowing. There is chronic proximal occlusion of the cervical right ICA at the level carotid bulb (image 57 of series 503). Distal intracranial reconstitution likely suggest retrograde filling through the anterior [place]. There is high-grade short segment proximal stenosis of the left cervical ICA (image 53 of series 503), suboptimally evaluated due to calcified plaques. % of right carotid artery stenosis: 100% proximal ICA; 45% proximal CCA % of left carotid artery stenosis: At least >75% proximal ICA, suboptimally evaluated; at least 60-65% CCA ostial stenosis, suboptimally evaluated. There is a codominant vertebrobasilar arterial system. Calcified plaques in the proximal P1 segments bilaterally with associated at least mild posterior stenosis of the left vertebral artery. The cervical vertebral arteries are normal in course, size and contour without significant stenosis. Edentulous maxilla and mandible. Visualized soft tissues of the skull base and neck are unremarkable. Visualized paranasal sinuses and mastoids are patent. Visualized lung apices are clear. No acute fracture or aggressive osseous lesion. CTA 1. CTA neck demonstrates extensive atherosclerotic disease with: Chronic occlusion of the right cervical ICA. High-grade, near complete occlusion of proximal left ICA. 45% proximal right CCA stenosis. Greater than 75% proximal left ICA stenosis, suboptimally evaluated due to calcified plaques. 60-70% left CCA ostial stenosis, suboptimally evaluated due to calcified plaques. No aneurysm.  2. CTA head demonstrates chronic occlusion cavernous transformation of left MCA, indeterminate. There is distal reconstitution of the distal M2/M3 branches. Findings may suggest moyamoya disease versus chronic atherosclerotic occlusion. No aneurysm. 3. No intracranial mass or enhancing lesion. ED COURSE and DIFFERENTIAL DIAGNOSIS/MDM   CC/HPI/PE Summary, DDx:     Records Reviewed (source and summary of external notes): Prior medical records and Nursing notes    Vitals:    Vitals:    04/29/23 1115 04/29/23 1300 04/29/23 1431 04/29/23 1515   BP: (!) 156/58 139/60 (!) 160/60 (!) 147/57   Pulse:       Resp:       Temp:       SpO2: 99% 100% 99% 98%   Weight:       Height:            ED COURSE  ED Course as of 04/30/23 0726   Sat Apr 29, 2023   [age]-year-old female presents for evaluation of headache. Started last night around 6 PM.  Since that time patient has been slightly more lethargic and confused than baseline per family at bedside. 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 On neurologic exam she has no focal neurologic deficits. She is slow to answer questions but answers appropriately. Speech is at baseline per family. Differential diagnosis includes recurrent aneurysm versus migraine versus viral syndrome. Getting labs including a CBC, BMP and a CT and CTA of her head. [LW]   1326 Labs show sodium was 128. IV fluids given. Plan CT shows old stroke and remote surgical changes but nothing acute. CTA head and neck pending. [LW]   1336 Chronic complete proximal occlusion of the right cervical ICA. At least moderate to severe stenosis of proximal left ICA. At least 40-50% narrowing of the proximal right common carotid artery due to  predominantly noncalcified plaques (image 22 and 34 series 501). Densely  calcified plaques on the aortic arch and aortic branches origins preclude  optimal evaluation of at least mild associated ostial narrowing. IMPRESSION high-grade stenosis of the proximal cervical left ICA.     Transfer center contacted to speak to neuro intervention [LW]   [phone] with neuro IR. Need images sent to [facility]. Will call back [LW]   0 Dr. Yen Etienne from neurointerventional says scan is unchanged, NTD. [LW]   1501 UA ordered. Work-up otherwise negative. CT of the head unchanged. Patient will follow-up as an outpatient. Signed out to evening physician. [LW]   1615 Nitrites: Negative [HP]   1615 Leukocyte Esterase(!): Moderate [HP]   1615 WBC: 10-20 [HP]   1615 RBC: 5-10  UTI present [HP]   1617 We will order Bactrim 1 DS 1 tablet p.o. in the ER and discharged home with 5-day Bactrim DS twice daily. [HP]      ED Course User Index  [HP] Jacqueline Moeller MD  [LW] Cat Arevalo MD       Disposition Considerations (Tests not done, Shared Decision Making, Pt Expectation of Test or Treatment.): See MDM    Patient was given the following medications:  Medications   acetaminophen (TYLENOL) tablet 1,000 mg (1,000 mg Oral Given 4/29/23 1114)   morphine injection 2 mg (2 mg IntraVENous Given 4/29/23 1114)   iopamidoL (ISOVUE-370) 370 mg iodine /mL (76 %) injection 100 mL (100 mL IntraVENous Given 4/29/23 1142)   sodium chloride 0.9 % bolus infusion 1,000 mL (0 mL IntraVENous IV Completed 4/29/23 1516)   trimethoprim-sulfamethoxazole (BACTRIM DS, SEPTRA DS) 160-800 mg per tablet 1 Tablet (1 Tablet Oral Given 4/29/23 1627)       CONSULTS: (Who and What was discussed)  None     Social Determinants affecting Dx or Tx: None    Smoking Cessation: Not Applicable    PROCEDURES   Unless otherwise noted above, none. Procedures      CRITICAL CARE TIME   Patient does not meet Critical Care Time, 0 minutes    FINAL IMPRESSION     1. Urinary tract infection without hematuria, site unspecified          DISPOSITION/PLAN   Discharged    Discharge Note: The patient is stable for discharge home. The signs, symptoms, diagnosis, and discharge instructions have been discussed, understanding conveyed, and agreed upon.  The patient is to follow up as recommended or return to ER should their symptoms worsen. PATIENT REFERRED TO:  Follow-up Information       Follow up With Specialties Details Why Contact Info    Armida Albert MD [facility]   [address] [facility]  [phone]                DISCHARGE MEDICATIONS:  Discharge Medication List as of 4/29/2023  4:22 PM        START taking these medications    Details   trimethoprim-sulfamethoxazole (Bactrim DS) 160-800 mg per tablet Take 1 Tablet by mouth two (2) times a day for 5 days. , Normal, Disp-10 Tablet, R-0           CONTINUE these medications which have NOT CHANGED    Details   SITagliptin (Januvia) 100 mg tablet Take 100 mg by mouth daily. , Historical Med      clopidogreL (PLAVIX) 75 mg tab Take 75 mg by mouth daily. , Historical Med      enalapril (VASOTEC) 2.5 mg tablet Take 2.5 mg by mouth daily. , Historical Med      lovastatin (MEVACOR) 40 mg tablet Take 40 mg by mouth daily. , Historical Med      metFORMIN (GLUCOPHAGE) 1,000 mg tablet Take 1,000 mg by mouth two (2) times a day., Historical Med      metoprolol succinate (TOPROL-XL) 25 mg XL tablet Take 25 mg by mouth daily. , Historical Med               DISCONTINUED MEDICATIONS:  Discharge Medication List as of 4/29/2023  4:22 PM          I am the Primary Clinician of Record: Eva Wallis MD (electronically signed)    (Please note that parts of this dictation were completed with voice recognition software. Quite often unanticipated grammatical, syntax, homophones, and other interpretive errors are inadvertently transcribed by the computer software. Please disregards these errors.  Please excuse any errors that have escaped final proofreading.)

## 2023-05-18 RX ORDER — CLOPIDOGREL BISULFATE 75 MG/1
75 TABLET ORAL DAILY
COMMUNITY

## 2023-05-18 RX ORDER — METOPROLOL SUCCINATE 25 MG/1
25 TABLET, EXTENDED RELEASE ORAL DAILY
COMMUNITY

## 2023-05-18 RX ORDER — LOVASTATIN 40 MG/1
40 TABLET ORAL DAILY
COMMUNITY

## 2023-05-18 RX ORDER — ENALAPRIL MALEATE 2.5 MG/1
2.5 TABLET ORAL DAILY
COMMUNITY